# Patient Record
Sex: MALE | Race: WHITE | Employment: OTHER | URBAN - NONMETROPOLITAN AREA
[De-identification: names, ages, dates, MRNs, and addresses within clinical notes are randomized per-mention and may not be internally consistent; named-entity substitution may affect disease eponyms.]

---

## 2021-10-21 ENCOUNTER — APPOINTMENT (OUTPATIENT)
Dept: GENERAL RADIOLOGY | Age: 56
DRG: 918 | End: 2021-10-21
Payer: MEDICAID

## 2021-10-21 ENCOUNTER — HOSPITAL ENCOUNTER (INPATIENT)
Age: 56
LOS: 1 days | Discharge: HOME | DRG: 918 | End: 2021-10-22
Attending: EMERGENCY MEDICINE | Admitting: INTERNAL MEDICINE
Payer: MEDICAID

## 2021-10-21 DIAGNOSIS — T50.904A DRUG OVERDOSE, UNDETERMINED INTENT, INITIAL ENCOUNTER: Primary | ICD-10-CM

## 2021-10-21 PROBLEM — T50.902A INTENTIONAL DRUG OVERDOSE (HCC): Status: ACTIVE | Noted: 2021-10-21

## 2021-10-21 LAB
ACETAMINOPHEN LEVEL: <15 UG/ML
ACETAMINOPHEN LEVEL: <15 UG/ML
ALBUMIN SERPL-MCNC: 4.4 G/DL (ref 3.5–5.2)
ALP BLD-CCNC: 94 U/L (ref 40–130)
ALT SERPL-CCNC: 18 U/L (ref 5–41)
ANION GAP SERPL CALCULATED.3IONS-SCNC: 7 MMOL/L (ref 7–19)
AST SERPL-CCNC: 15 U/L (ref 5–40)
BASE EXCESS ARTERIAL: 1.9 MMOL/L (ref -2–2)
BILIRUB SERPL-MCNC: 0.4 MG/DL (ref 0.2–1.2)
BUN BLDV-MCNC: 10 MG/DL (ref 6–20)
CALCIUM SERPL-MCNC: 9.4 MG/DL (ref 8.6–10)
CARBOXYHEMOGLOBIN ARTERIAL: 1.8 % (ref 0–5)
CHLORIDE BLD-SCNC: 106 MMOL/L (ref 98–111)
CO2: 26 MMOL/L (ref 22–29)
CREAT SERPL-MCNC: 0.7 MG/DL (ref 0.5–1.2)
ETHANOL: <10 MG/DL (ref 0–0.08)
GFR AFRICAN AMERICAN: >59
GFR NON-AFRICAN AMERICAN: >60
GLUCOSE BLD-MCNC: 114 MG/DL (ref 74–109)
HCO3 ARTERIAL: 26.6 MMOL/L (ref 22–26)
HCT VFR BLD CALC: 44.3 % (ref 42–52)
HEMOGLOBIN, ART, EXTENDED: 14.5 G/DL (ref 14–18)
HEMOGLOBIN: 14.7 G/DL (ref 14–18)
INR BLD: 0.91 (ref 0.88–1.18)
MCH RBC QN AUTO: 30.2 PG (ref 27–31)
MCHC RBC AUTO-ENTMCNC: 33.2 G/DL (ref 33–37)
MCV RBC AUTO: 91 FL (ref 80–94)
METHEMOGLOBIN ARTERIAL: 1.4 %
O2 CONTENT ARTERIAL: 18.6 ML/DL
O2 SAT, ARTERIAL: 91.3 %
O2 THERAPY: ABNORMAL
PCO2 ARTERIAL: 41 MMHG (ref 35–45)
PDW BLD-RTO: 13.3 % (ref 11.5–14.5)
PH ARTERIAL: 7.42 (ref 7.35–7.45)
PLATELET # BLD: 234 K/UL (ref 130–400)
PMV BLD AUTO: 11.5 FL (ref 9.4–12.4)
PO2 ARTERIAL: 61 MMHG (ref 80–100)
POTASSIUM SERPL-SCNC: 3.6 MMOL/L (ref 3.5–5)
POTASSIUM, WHOLE BLOOD: 3.4
PROTHROMBIN TIME: 12.5 SEC (ref 12–14.6)
RBC # BLD: 4.87 M/UL (ref 4.7–6.1)
SALICYLATE, SERUM: <3 MG/DL (ref 3–10)
SARS-COV-2, NAAT: NOT DETECTED
SODIUM BLD-SCNC: 139 MMOL/L (ref 136–145)
TOTAL CK: 186 U/L (ref 39–308)
TOTAL PROTEIN: 7.4 G/DL (ref 6.6–8.7)
WBC # BLD: 12.5 K/UL (ref 4.8–10.8)

## 2021-10-21 PROCEDURE — 36415 COLL VENOUS BLD VENIPUNCTURE: CPT

## 2021-10-21 PROCEDURE — 84132 ASSAY OF SERUM POTASSIUM: CPT

## 2021-10-21 PROCEDURE — 82077 ASSAY SPEC XCP UR&BREATH IA: CPT

## 2021-10-21 PROCEDURE — 82803 BLOOD GASES ANY COMBINATION: CPT

## 2021-10-21 PROCEDURE — 2580000003 HC RX 258: Performed by: INTERNAL MEDICINE

## 2021-10-21 PROCEDURE — 82550 ASSAY OF CK (CPK): CPT

## 2021-10-21 PROCEDURE — 2580000003 HC RX 258: Performed by: EMERGENCY MEDICINE

## 2021-10-21 PROCEDURE — 36600 WITHDRAWAL OF ARTERIAL BLOOD: CPT

## 2021-10-21 PROCEDURE — 71045 X-RAY EXAM CHEST 1 VIEW: CPT

## 2021-10-21 PROCEDURE — 80143 DRUG ASSAY ACETAMINOPHEN: CPT

## 2021-10-21 PROCEDURE — 99284 EMERGENCY DEPT VISIT MOD MDM: CPT

## 2021-10-21 PROCEDURE — 80053 COMPREHEN METABOLIC PANEL: CPT

## 2021-10-21 PROCEDURE — 93005 ELECTROCARDIOGRAM TRACING: CPT | Performed by: EMERGENCY MEDICINE

## 2021-10-21 PROCEDURE — 80179 DRUG ASSAY SALICYLATE: CPT

## 2021-10-21 PROCEDURE — 85027 COMPLETE CBC AUTOMATED: CPT

## 2021-10-21 PROCEDURE — 2000000000 HC ICU R&B

## 2021-10-21 PROCEDURE — 85610 PROTHROMBIN TIME: CPT

## 2021-10-21 PROCEDURE — 87635 SARS-COV-2 COVID-19 AMP PRB: CPT

## 2021-10-21 RX ORDER — SODIUM CHLORIDE 9 MG/ML
INJECTION, SOLUTION INTRAVENOUS CONTINUOUS
Status: DISCONTINUED | OUTPATIENT
Start: 2021-10-21 | End: 2021-10-22 | Stop reason: HOSPADM

## 2021-10-21 RX ORDER — SODIUM CHLORIDE 0.9 % (FLUSH) 0.9 %
5-40 SYRINGE (ML) INJECTION EVERY 12 HOURS SCHEDULED
Status: DISCONTINUED | OUTPATIENT
Start: 2021-10-21 | End: 2021-10-22 | Stop reason: HOSPADM

## 2021-10-21 RX ORDER — ONDANSETRON 2 MG/ML
4 INJECTION INTRAMUSCULAR; INTRAVENOUS EVERY 6 HOURS PRN
Status: DISCONTINUED | OUTPATIENT
Start: 2021-10-21 | End: 2021-10-22 | Stop reason: HOSPADM

## 2021-10-21 RX ORDER — ONDANSETRON 4 MG/1
4 TABLET, ORALLY DISINTEGRATING ORAL EVERY 8 HOURS PRN
Status: DISCONTINUED | OUTPATIENT
Start: 2021-10-21 | End: 2021-10-22 | Stop reason: HOSPADM

## 2021-10-21 RX ORDER — SODIUM CHLORIDE 0.9 % (FLUSH) 0.9 %
5-40 SYRINGE (ML) INJECTION PRN
Status: DISCONTINUED | OUTPATIENT
Start: 2021-10-21 | End: 2021-10-22 | Stop reason: HOSPADM

## 2021-10-21 RX ORDER — SODIUM CHLORIDE 9 MG/ML
25 INJECTION, SOLUTION INTRAVENOUS PRN
Status: DISCONTINUED | OUTPATIENT
Start: 2021-10-21 | End: 2021-10-22 | Stop reason: HOSPADM

## 2021-10-21 RX ADMIN — SODIUM CHLORIDE: 9 INJECTION, SOLUTION INTRAVENOUS at 17:55

## 2021-10-21 RX ADMIN — SODIUM CHLORIDE, PRESERVATIVE FREE 10 ML: 5 INJECTION INTRAVENOUS at 21:26

## 2021-10-21 ASSESSMENT — PAIN SCALES - GENERAL
PAINLEVEL_OUTOF10: 0

## 2021-10-21 ASSESSMENT — ENCOUNTER SYMPTOMS
BACK PAIN: 0
DIARRHEA: 0
EYE PAIN: 0
ABDOMINAL PAIN: 0
SHORTNESS OF BREATH: 0
VOMITING: 0

## 2021-10-21 NOTE — ED PROVIDER NOTES
[facility] ICU  eMERGENCY dEPARTMENT eNCOUnter      Pt Name: Ada Kelly  MRN: [id number]  [place] 1965  Date of evaluation: 10/21/2021  Provider: Hector Kent MD    CHIEF COMPLAINT       Chief Complaint   Patient presents with    Drug Overdose     apparently swallowed 3-5 g of meth around 1500 today in attempts to hide it from police.  Seizures     seizure in back of police car, none for EMS but noted tremors         HISTORY OF PRESENT ILLNESS   (Location/Symptom, Timing/Onset,Context/Setting, Quality, Duration, Modifying Factors, Severity)  Note limiting factors. Ada Kelly is a 49 y.o. male who presents to the emergency department due to seizure after ingesting meth. Patient ingested large amount of meth around 3:00 today. Arrested for DUI. Patient said he did not want to get in trouble for his meth possession so he swallowed a large quantity of meth that was wrapped in cellophane. Police took him to long term and on the way to long term patient had a seizure. EMS was then called and brought patient here. Patient is a little diaphoretic and shaky now. Denies any complaints but is not really that forthcoming with information. Tells me he has no medical problems. Tells me he ingested the meth to avoid being arrested for drug possession. HPI    NursingNotes were reviewed. REVIEW OF SYSTEMS    (2-9 systems for level 4, 10 or more for level 5)     Review of Systems   Constitutional: Positive for diaphoresis. Negative for fever. Eyes: Negative for pain. Respiratory: Negative for shortness of breath. Cardiovascular: Negative for chest pain and palpitations. Gastrointestinal: Negative for abdominal pain, diarrhea and vomiting. Genitourinary: Negative for difficulty urinating. Musculoskeletal: Negative for back pain and neck pain. Skin: Negative for rash. Neurological: Negative for weakness, numbness and headaches. All other systems reviewed and are negative.       A complete review of systems was performed and is negative except as noted above in the HPI. PAST MEDICAL HISTORY   History reviewed. No pertinent past medical history. SURGICAL HISTORY     History reviewed. No pertinent surgical history. CURRENT MEDICATIONS       There are no discharge medications for this patient. ALLERGIES     Patient has no known allergies. FAMILY HISTORY     No family history on file. SOCIAL HISTORY       Social History     Socioeconomic History    Marital status: Unknown     Spouse name: None    Number of children: None    Years of education: None    Highest education level: None   Occupational History    None   Tobacco Use    Smoking status: Current Every Day Smoker     Packs/day: 0.50     Types: Cigarettes    Smokeless tobacco: Never Used   Substance and Sexual Activity    Alcohol use: None    Drug use: None    Sexual activity: None   Other Topics Concern    None   Social History Narrative    None     Social Determinants of Health     Financial Resource Strain:     Difficulty of Paying Living Expenses:    Food Insecurity:     Worried About Running Out of Food in the Last Year:     Ran Out of Food in the Last Year:    Transportation Needs:     Lack of Transportation (Medical):      Lack of Transportation (Non-Medical):    Physical Activity:     Days of Exercise per Week:     Minutes of Exercise per Session:    Stress:     Feeling of Stress :    Social Connections:     Frequency of Communication with Friends and Family:     Frequency of Social Gatherings with Friends and Family:     Attends [facility] Services:     Active Member of Clubs or Organizations:     Attends Club or Organization Meetings:     Marital Status:    Intimate Partner Violence:     Fear of Current or Ex-Partner:     Emotionally Abused:     Physically Abused:     Sexually Abused:        SCREENINGS             PHYSICAL EXAM    (up to 7 for level 4, 8 or more for level 5)     ED Triage Vitals [10/21/21 1715]   BP Temp Temp Source Pulse Resp SpO2 Height Weight   131/85 98 °F (36.7 °C) Axillary 106 22 92 % -- --       Physical Exam  Vitals reviewed. Constitutional:       General: He is not in acute distress. Appearance: He is well-developed. He is diaphoretic. Comments: Patient is slightly restless and diaphoretic. HENT:      Head: Normocephalic and atraumatic. Mouth/Throat:      Mouth: Mucous membranes are moist.      Pharynx: Oropharynx is clear. Eyes:      General: No scleral icterus. Pupils: Pupils are equal, round, and reactive to light. Neck:      Vascular: No JVD. Cardiovascular:      Rate and Rhythm: Regular rhythm. Tachycardia present. Heart sounds: Normal heart sounds. Pulmonary:      Effort: Pulmonary effort is normal. No respiratory distress. Breath sounds: Normal breath sounds. Abdominal:      General: There is no distension. Palpations: Abdomen is soft. Tenderness: There is no abdominal tenderness. There is no guarding or rebound. Musculoskeletal:         General: No tenderness. Cervical back: Normal range of motion and neck supple. Right lower leg: Edema present. Left lower leg: Edema present. Comments: Mild edema in bilateral lower extremities. Patient tells me this is baseline   Skin:     General: Skin is warm. Capillary Refill: Capillary refill takes less than 2 seconds. Neurological:      General: No focal deficit present. Mental Status: He is alert and oriented to person, place, and time. Psychiatric:         Mood and Affect: Affect is flat. Speech: Speech normal.         Behavior: Behavior normal.         DIAGNOSTIC RESULTS     EKG: All EKG's are interpreted by the Emergency Department Physician who either signs or Co-signs this chart in the absence of a cardiologist.    Sinus tachycardia. Borderline prolonged QT. I do not see signs of acute ischemia.     RADIOLOGY: Non-plain film images such as CT, Ultrasound and MRI are read by the radiologist. Fletcher Bernsteiner images are visualized and preliminarily interpreted by the emergency physician with the below findings:      Interpretation per the Radiologist below, if available at the time of this note:    XR CHEST PORTABLE   Final Result   1. No acute cardiopulmonary finding. Signed by Dr Madelene Cogan:  Labs Reviewed   BLOOD GAS, ARTERIAL - Abnormal; Notable for the following components:       Result Value    pO2, Arterial 61.0 (*)     HCO3, Arterial 26.6 (*)     All other components within normal limits   CBC - Abnormal; Notable for the following components:    WBC 12.5 (*)     All other components within normal limits   COMPREHENSIVE METABOLIC PANEL - Abnormal; Notable for the following components:    Glucose 114 (*)     All other components within normal limits   COVID-19, RAPID   ACETAMINOPHEN LEVEL   ETHANOL   SALICYLATE LEVEL   PROTIME-INR   POTASSIUM, WHOLE BLOOD   CK   URINE DRUG SCREEN   URINE RT REFLEX TO CULTURE   ACETAMINOPHEN LEVEL   COMPREHENSIVE METABOLIC PANEL W/ REFLEX TO MG FOR LOW K   CBC WITH AUTO DIFFERENTIAL       All other labs were within normal range or not returned as of this dictation. EMERGENCY DEPARTMENT COURSE and DIFFERENTIALDIAGNOSIS/MDM:   Vitals:    Vitals:    10/21/21 1832 10/21/21 1901 10/21/21 1931 10/21/21 2115   BP: 100/76 (!) 129/91 121/88 129/80   Pulse: 106 106 104 101   Resp: 25 27 21 22   Temp:    98.6 °F (37 °C)   TempSrc:    Temporal   SpO2: 91% 91% 91% 91%   Weight:    206 lb 4.8 oz (93.6 kg)   Height:    6' 3\" (1.905 m)       MDM  Patient's case discussed with poison control who recommended supportive care and monitoring for minimum of 12 hours. They also recommended adding a CK which I will do. Case discussed with Dr. Kaur Johnson, hospitalist, who is agreeable plan of care and admission to the ICU. Patient still a bit shaky and diaphoretic.   Slightly tachycardic. Patient denies any complaints at this time. CONSULTS:  None    PROCEDURES:  Unless otherwise notedbelow, none     Procedures    FINAL IMPRESSION     1. Drug overdose, undetermined intent, initial encounter          DISPOSITION/PLAN   DISPOSITION Admitted 10/21/2021 06:15:25 PM      PATIENT REFERRED TO:  @FUP@    DISCHARGE MEDICATIONS:  There are no discharge medications for this patient.          (Please note that portions of this note were completed with a voice recognition program.  Efforts were made to edit the dictations butoccasionally words are mis-transcribed.)    Javon Velez MD (electronically signed)  AttendingEmergency Physician          Javon Velez MD  10/21/21 3266

## 2021-10-21 NOTE — ED NOTES
Bed: 02-A  Expected date:   Expected time:   Means of arrival:   Comments:  EMS [address], RN  10/21/21 5552

## 2021-10-21 NOTE — PROGRESS NOTES
10/21/2021  5:54 PM - [facility] Incoming Lab Results From [facility] Value Ref Range & Units Status Collected Lab   pH, Arterial 7.420  7.350 - 7.450 Final 10/21/2021  5:53 PM [facility] Lab   pCO2, Arterial 41.0  35.0 - 45.0 mmHg Final 10/21/2021  5:53 PM [facility] Lab   pO2, Arterial 61. 0Low   80.0 - 100.0 mmHg Final 10/21/2021  5:53 PM [facility] Lab   HCO3, Arterial 26. 6High   22.0 - 26.0 mmol/L Final 10/21/2021  5:53 PM [facility] Excess, Arterial 1.9  -2.0 - 2.0 mmol/L Final 10/21/2021  5:53 PM [address] [place] [address] Lab   Hemoglobin, Art, Extended 14.5  14.0 - 18.0 g/dL Final 10/21/2021  5:53 PM [address] [place] [address] Lab   O2 Sat, Arterial 91.3  >92 % Final 10/21/2021  5:53 PM [facility] Lab   Carboxyhgb, Arterial 1.8  0.0 - 5.0 % Final 10/21/2021  5:53 PM [facility] Lab        0.0-1.5   (Smokers 1.5-5.0)    Methemoglobin, Arterial 1.4  <1.5 % Final 10/21/2021  5:53 PM [address] [place] [address] Lab   O2 Content, Arterial 18.6  Not Established mL/dL Final 10/21/2021  5:53 PM [address] [place] [address] Lab     Pt on room air  resp rate 28  Right radial puncture AT+

## 2021-10-21 NOTE — H&P
[facility] Medicine H&P    Patient:  Shane Gallegos  MRN: [id number]    Consulting Physician: Mark Espino MD  Reason for Consult: Medical Management  Primacy Care Physician: No primary care provider on file. HISTORY OF PRESENT ILLNESS:   The patient is a 49 y.o. male was brought in by police after being arrested for DUI. The ER attending tells me that he swallowed a bag of methamphetamine. The patient is reluctant to give any history. He will be admitted to the ICU for observation. Poison control has been contacted and they recommend observation at this point and allow time to pass the plastic. Past Medical History:  No past medical history on file. Past Surgical History:  No past surgical history on file. Medications: Scheduled Meds:  Continuous Infusions:   sodium chloride 100 mL/hr at 10/21/21 1755     PRN Meds:. Allergies:  Patient has no known allergies. Social History:   TOBACCO:   has no history on file for tobacco use. ETOH:   has no history on file for alcohol use. Family History:   No family history on file. ROS:  Review of Systems   Unable to perform ROS: Acuity of condition       Physical Exam:    Vitals: /85   Pulse 106   Temp 98 °F (36.7 °C) (Axillary)   Resp 26   SpO2 (!) 87%     Physical Exam  Vitals and nursing note reviewed. Constitutional:       General: He is in acute distress. Appearance: He is ill-appearing, toxic-appearing and diaphoretic. HENT:      Head: Normocephalic and atraumatic. Right Ear: External ear normal.      Left Ear: External ear normal.      Nose: Nose normal.      Mouth/Throat:      Mouth: Mucous membranes are moist.   Eyes:      Conjunctiva/sclera: Conjunctivae normal.      Pupils: Pupils are equal, round, and reactive to light. Cardiovascular:      Rate and Rhythm: Regular rhythm. Tachycardia present. Heart sounds: Normal heart sounds.    Pulmonary:      Effort: Pulmonary effort is normal.      Breath sounds: Normal breath sounds. Abdominal:      General: Abdomen is flat. Palpations: Abdomen is soft. Musculoskeletal:         General: No swelling. Cervical back: Neck supple. No rigidity. Skin:     General: Skin is warm. CBC:   Recent Labs     10/21/21  1715   WBC 12.5*   HGB 14.7        BMP:    Recent Labs     10/21/21  1715 10/21/21  1753     --    K 3.6 3.4     --    CO2 26  --    BUN 10  --    CREATININE 0.7  --    GLUCOSE 114*  --      Hepatic:   Recent Labs     10/21/21  1715   AST 15   ALT 18   BILITOT 0.4   ALKPHOS 94     Troponin: No results for input(s): TROPONINI in the last 72 hours. BNP: No results for input(s): BNP in the last 72 hours. Lipids: No results for input(s): CHOL, HDL in the last 72 hours. Invalid input(s): LDLCALCU  ABGs:   Lab Results   Component Value Date    PHART 7.420 10/21/2021    PO2ART 61.0 10/21/2021    GGP0RBQ 41.0 10/21/2021     INR:   Recent Labs     10/21/21  1715   INR 0.91     -----------------------------------------------------------------  No results found. Assessment and Plan     Intentional drug overdose - methamphetamine. Admit to ICU. Observation. Supportive care. Please document 35 minutes of critical care time for patient assessment, chart review, discussion with staff, .       Kymberly Mann DO

## 2021-10-22 VITALS
BODY MASS INDEX: 25.65 KG/M2 | OXYGEN SATURATION: 92 % | HEIGHT: 75 IN | HEART RATE: 93 BPM | RESPIRATION RATE: 18 BRPM | SYSTOLIC BLOOD PRESSURE: 129 MMHG | TEMPERATURE: 98.8 F | WEIGHT: 206.3 LBS | DIASTOLIC BLOOD PRESSURE: 86 MMHG

## 2021-10-22 PROBLEM — T50.902A INTENTIONAL DRUG OVERDOSE (HCC): Status: RESOLVED | Noted: 2021-10-21 | Resolved: 2021-10-22

## 2021-10-22 LAB
ALBUMIN SERPL-MCNC: 3.9 G/DL (ref 3.5–5.2)
ALP BLD-CCNC: 83 U/L (ref 40–130)
ALT SERPL-CCNC: 14 U/L (ref 5–41)
AMPHETAMINE SCREEN, URINE: POSITIVE
ANION GAP SERPL CALCULATED.3IONS-SCNC: 11 MMOL/L (ref 7–19)
AST SERPL-CCNC: 15 U/L (ref 5–40)
BARBITURATE SCREEN URINE: NEGATIVE
BASOPHILS ABSOLUTE: 0 K/UL (ref 0–0.2)
BASOPHILS RELATIVE PERCENT: 0.4 % (ref 0–1)
BENZODIAZEPINE SCREEN, URINE: NEGATIVE
BILIRUB SERPL-MCNC: 0.5 MG/DL (ref 0.2–1.2)
BILIRUBIN URINE: NEGATIVE
BLOOD, URINE: NEGATIVE
BUN BLDV-MCNC: 8 MG/DL (ref 6–20)
CALCIUM SERPL-MCNC: 8.6 MG/DL (ref 8.6–10)
CANNABINOID SCREEN URINE: NEGATIVE
CHLORIDE BLD-SCNC: 108 MMOL/L (ref 98–111)
CLARITY: CLEAR
CO2: 21 MMOL/L (ref 22–29)
COCAINE METABOLITE SCREEN URINE: NEGATIVE
COLOR: YELLOW
CREAT SERPL-MCNC: 0.6 MG/DL (ref 0.5–1.2)
EKG P AXIS: 65 DEGREES
EKG P-R INTERVAL: 158 MS
EKG Q-T INTERVAL: 362 MS
EKG QRS DURATION: 106 MS
EKG QTC CALCULATION (BAZETT): 442 MS
EKG T AXIS: 57 DEGREES
EOSINOPHILS ABSOLUTE: 0 K/UL (ref 0–0.6)
EOSINOPHILS RELATIVE PERCENT: 0.2 % (ref 0–5)
GFR AFRICAN AMERICAN: >59
GFR NON-AFRICAN AMERICAN: >60
GLUCOSE BLD-MCNC: 114 MG/DL (ref 74–109)
GLUCOSE URINE: NEGATIVE MG/DL
HCT VFR BLD CALC: 41.7 % (ref 42–52)
HEMOGLOBIN: 13.8 G/DL (ref 14–18)
IMMATURE GRANULOCYTES #: 0 K/UL
KETONES, URINE: 15 MG/DL
LEUKOCYTE ESTERASE, URINE: NEGATIVE
LYMPHOCYTES ABSOLUTE: 1.2 K/UL (ref 1.1–4.5)
LYMPHOCYTES RELATIVE PERCENT: 22.9 % (ref 20–40)
Lab: ABNORMAL
MCH RBC QN AUTO: 30.9 PG (ref 27–31)
MCHC RBC AUTO-ENTMCNC: 33.1 G/DL (ref 33–37)
MCV RBC AUTO: 93.5 FL (ref 80–94)
MONOCYTES ABSOLUTE: 0.5 K/UL (ref 0–0.9)
MONOCYTES RELATIVE PERCENT: 9.4 % (ref 0–10)
NEUTROPHILS ABSOLUTE: 3.5 K/UL (ref 1.5–7.5)
NEUTROPHILS RELATIVE PERCENT: 66.9 % (ref 50–65)
NITRITE, URINE: NEGATIVE
OPIATE SCREEN URINE: NEGATIVE
PDW BLD-RTO: 13.6 % (ref 11.5–14.5)
PH UA: 5 (ref 5–8)
PLATELET # BLD: 201 K/UL (ref 130–400)
PMV BLD AUTO: 11.7 FL (ref 9.4–12.4)
POTASSIUM REFLEX MAGNESIUM: 4 MMOL/L (ref 3.5–5)
PROTEIN UA: NEGATIVE MG/DL
RBC # BLD: 4.46 M/UL (ref 4.7–6.1)
SODIUM BLD-SCNC: 140 MMOL/L (ref 136–145)
SPECIFIC GRAVITY UA: 1.02 (ref 1–1.03)
TOTAL PROTEIN: 6.3 G/DL (ref 6.6–8.7)
UROBILINOGEN, URINE: 0.2 E.U./DL
WBC # BLD: 5.2 K/UL (ref 4.8–10.8)

## 2021-10-22 PROCEDURE — 2580000003 HC RX 258: Performed by: INTERNAL MEDICINE

## 2021-10-22 PROCEDURE — 80307 DRUG TEST PRSMV CHEM ANLYZR: CPT

## 2021-10-22 PROCEDURE — 36415 COLL VENOUS BLD VENIPUNCTURE: CPT

## 2021-10-22 PROCEDURE — 93010 ELECTROCARDIOGRAM REPORT: CPT | Performed by: INTERNAL MEDICINE

## 2021-10-22 PROCEDURE — 80053 COMPREHEN METABOLIC PANEL: CPT

## 2021-10-22 PROCEDURE — 85025 COMPLETE CBC W/AUTO DIFF WBC: CPT

## 2021-10-22 PROCEDURE — 81003 URINALYSIS AUTO W/O SCOPE: CPT

## 2021-10-22 PROCEDURE — 6360000002 HC RX W HCPCS: Performed by: INTERNAL MEDICINE

## 2021-10-22 RX ADMIN — ENOXAPARIN SODIUM 40 MG: 40 INJECTION SUBCUTANEOUS at 09:04

## 2021-10-22 RX ADMIN — SODIUM CHLORIDE, PRESERVATIVE FREE 10 ML: 5 INJECTION INTRAVENOUS at 09:04

## 2021-10-22 RX ADMIN — SODIUM CHLORIDE: 9 INJECTION, SOLUTION INTRAVENOUS at 06:23

## 2021-10-22 ASSESSMENT — PAIN SCALES - GENERAL
PAINLEVEL_OUTOF10: 0

## 2021-10-22 NOTE — DISCHARGE SUMMARY
Discharge Summary    Dana Quezada  :  1965  MRN:  [id number]    Admit date:  10/21/2021  Discharge date: 10/22/2021     Admitting Physician:  Lisandro Schumacher DO    Advance Directive: Full Code    Consults: none    Primary Care Physician:  No primary care provider on file. Discharge Diagnoses: Active Problems:    * No active hospital problems. *  Resolved Problems:    Intentional drug overdose ([address]      Significant Diagnostic Studies:   XR CHEST PORTABLE    Result Date: 10/21/2021  EXAM: XR CHEST PORTABLE -- 10/21/2021 6:39 PM HISTORY: 49 years, Male, overdose, methamphetamine, seizure according to clinical note COMPARISON: No existing relevant imaging studies available TECHNIQUE:  1 images. Frontal view of the chest. FINDINGS:  Low lung volumes. No pneumothorax, pleural effusion or focal consolidation. Normal cardiac and mediastinal contours. No acute findings of the bones, soft tissues or upper abdomen. 1. No acute cardiopulmonary finding. Signed by Dr Rajinder Mohr      CBC:   Recent Labs     10/21/21  1715 10/22/21  0124   WBC 12.5* 5.2   HGB 14.7 13.8*    201     BMP:    Recent Labs     10/21/21  1715 10/21/21  1753 10/22/21  0124     --  140   K 3.6 3.4 4.0     --  108   CO2 26  --  21*   BUN 10  --  8   CREATININE 0.7  --  0.6   GLUCOSE 114*  --  114*     INR:   Recent Labs     10/21/21  1715   INR 0.91     Lipids: No results for input(s): CHOL, HDL in the last 72 hours. Invalid input(s): LDLCALCU  ABGs:  Recent Labs     10/21/21  1753   PHART 7.420   CCL3FOO 41.0   PO2ART 61.0*   QXV2QLY 26.6*   BEART 1.9   HGBAE 14.5   P3VXQSPX 91.3   CARBOXHGBART 1.8   02THERAPY Unknown     HgBA1c:  No results for input(s): LABA1C in the last 72 hours. Procedures: None  Hospital Course: Mr. Jose Infante was admitted on 10/21 after swallowing a bag of methamphetamine. He was admitted to the ICU. He was observed overnight. No significant side effects were noted.   Hemodynamics remained stable. On the morning of 10/22 he was essentially back to baseline and I felt he was stable for discharge. Physical Exam:  Vital Signs: BP (!) 134/96   Pulse 94   Temp 98.8 °F (37.1 °C) (Temporal)   Resp 21   Ht 6' 3\" (1.905 m)   Wt 206 lb 4.8 oz (93.6 kg)   SpO2 96%   BMI 25.79 kg/m²   General appearance:. Alert and Cooperative   HEENT: Normocephalic. Chest: clear to auscultation bilaterally without wheezes or rhonchi. Cardiac: Normal heart tones with regular rate and rhythm, S1, S2 normal. No murmurs, gallops, or rubs auscultated. Abdomen:soft, non-tender; normal bowel sounds, no masses, no organomegaly. Extremities: No clubbing or cyanosis. No peripheral edema. Peripheral pulses palpable. Neurologic: Grossly intact. Discharge Medications: There are no discharge medications for this patient. Discharge Instructions: Follow up with PCP in 3-5 days. Take medications as directed. Resume activity as tolerated. Diet: ADULT DIET; Clear Liquid     Disposition: Patient is medically stable and will be discharged to home. Time spent on discharge 40 minutes.     Signed:  Shaquille Pablo,

## 2021-12-25 ENCOUNTER — HOSPITAL ENCOUNTER (EMERGENCY)
Age: 56
Discharge: HOME | End: 2021-12-25
Attending: EMERGENCY MEDICINE
Payer: MEDICAID

## 2021-12-25 ENCOUNTER — APPOINTMENT (OUTPATIENT)
Dept: GENERAL RADIOLOGY | Age: 56
End: 2021-12-25
Payer: MEDICAID

## 2021-12-25 VITALS
HEIGHT: 75 IN | TEMPERATURE: 97.2 F | WEIGHT: 209 LBS | BODY MASS INDEX: 25.99 KG/M2 | HEART RATE: 110 BPM | SYSTOLIC BLOOD PRESSURE: 114 MMHG | RESPIRATION RATE: 20 BRPM | DIASTOLIC BLOOD PRESSURE: 84 MMHG | OXYGEN SATURATION: 97 %

## 2021-12-25 DIAGNOSIS — S61.412A LACERATION OF LEFT HAND WITHOUT FOREIGN BODY, INITIAL ENCOUNTER: Primary | ICD-10-CM

## 2021-12-25 PROCEDURE — 99283 EMERGENCY DEPT VISIT LOW MDM: CPT

## 2021-12-25 PROCEDURE — 90715 TDAP VACCINE 7 YRS/> IM: CPT | Performed by: NURSE PRACTITIONER

## 2021-12-25 PROCEDURE — 6370000000 HC RX 637 (ALT 250 FOR IP): Performed by: NURSE PRACTITIONER

## 2021-12-25 PROCEDURE — 6360000002 HC RX W HCPCS: Performed by: NURSE PRACTITIONER

## 2021-12-25 PROCEDURE — 2500000003 HC RX 250 WO HCPCS: Performed by: NURSE PRACTITIONER

## 2021-12-25 PROCEDURE — 73130 X-RAY EXAM OF HAND: CPT

## 2021-12-25 PROCEDURE — 90471 IMMUNIZATION ADMIN: CPT | Performed by: NURSE PRACTITIONER

## 2021-12-25 PROCEDURE — 12001 RPR S/N/AX/GEN/TRNK 2.5CM/<: CPT

## 2021-12-25 RX ORDER — CEPHALEXIN 500 MG/1
500 CAPSULE ORAL 3 TIMES DAILY
Qty: 21 CAPSULE | Refills: 0 | Status: SHIPPED | OUTPATIENT
Start: 2021-12-25 | End: 2022-01-01

## 2021-12-25 RX ORDER — CEPHALEXIN 250 MG/1
500 CAPSULE ORAL ONCE
Status: COMPLETED | OUTPATIENT
Start: 2021-12-25 | End: 2021-12-25

## 2021-12-25 RX ORDER — LIDOCAINE HYDROCHLORIDE 10 MG/ML
10 INJECTION, SOLUTION EPIDURAL; INFILTRATION; INTRACAUDAL; PERINEURAL ONCE
Status: COMPLETED | OUTPATIENT
Start: 2021-12-25 | End: 2021-12-25

## 2021-12-25 RX ADMIN — LIDOCAINE HYDROCHLORIDE 10 ML: 10 INJECTION, SOLUTION EPIDURAL; INFILTRATION; INTRACAUDAL; PERINEURAL at 22:29

## 2021-12-25 RX ADMIN — TETANUS TOXOID, REDUCED DIPHTHERIA TOXOID AND ACELLULAR PERTUSSIS VACCINE, ADSORBED 0.5 ML: 5; 2.5; 8; 8; 2.5 SUSPENSION INTRAMUSCULAR at 21:39

## 2021-12-25 RX ADMIN — CEPHALEXIN 500 MG: 250 CAPSULE ORAL at 22:34

## 2021-12-25 ASSESSMENT — PAIN SCALES - GENERAL: PAINLEVEL_OUTOF10: 8

## 2021-12-26 NOTE — ED NOTES
Pt's wrist cleaned with normal saline. mepilex dressing applied and left wrist splint.       Nitin Vides RN  12/25/21 3567

## 2021-12-26 NOTE — ED PROVIDER NOTES
[facility] EMERGENCY DEPT  eMERGENCY dEPARTMENT eNCOUnter      Pt Name: Hazel Gomez  MRN: [id number]  [place] 1965  Date of evaluation: 12/25/2021  Provider: Pearl Pedraza, [address]       Chief Complaint   Patient presents with    Hand Laceration         HISTORY OF PRESENT ILLNESS   (Location/Symptom, Timing/Onset,Context/Setting, Quality, Duration, Modifying Factors, Severity)  Note limiting factors. Hazel Gomez is a 49 y.o. male who presents to the emergency department with a laceration  to his left wrist hand area after putting it through a glass window by accident    The history is provided by the patient. Laceration  Location:  Hand  Hand laceration location:  L wrist and L hand  Length:  6  Depth: Through underlying tissue  Quality: straight    Bleeding: controlled    Time since incident:  1 hour  Laceration mechanism:  Broken glass  Foreign body present:  No foreign bodies  Tetanus status:  Unknown      NursingNotes were reviewed. REVIEW OF SYSTEMS    (2-9 systems for level 4, 10 or more for level 5)     Review of Systems   Skin: Positive for wound. Except as noted above the remainder of the review of systems was reviewed and negative. PAST MEDICAL HISTORY   History reviewed. No pertinent past medical history. SURGICALHISTORY     History reviewed. No pertinent surgical history. CURRENT MEDICATIONS       Discharge Medication List as of 12/25/2021 10:44 PM          ALLERGIES     Patient has no known allergies. FAMILY HISTORY     History reviewed. No pertinent family history.        SOCIAL HISTORY       Social History     Socioeconomic History    Marital status: Unknown     Spouse name: None    Number of children: None    Years of education: None    Highest education level: None   Occupational History    None   Tobacco Use    Smoking status: Current Every Day Smoker     Packs/day: 0.50     Types: Cigarettes    Smokeless tobacco: Never Used Substance and Sexual Activity    Alcohol use: None    Drug use: None    Sexual activity: None   Other Topics Concern    None   Social History Narrative    None     Social Determinants of Health     Financial Resource Strain:     Difficulty of Paying Living Expenses: Not on file   Food Insecurity:     Worried About Running Out of Food in the Last Year: Not on file    Tonny of Food in the Last Year: Not on file   Transportation Needs:     Lack of Transportation (Medical): Not on file    Lack of Transportation (Non-Medical): Not on file   Physical Activity:     Days of Exercise per Week: Not on file    Minutes of Exercise per Session: Not on file   Stress:     Feeling of Stress : Not on file   Social Connections:     Frequency of Communication with Friends and Family: Not on file    Frequency of Social Gatherings with Friends and Family: Not on file    Attends Restorationist Services: Not on file    Active Member of [address] [place] [address] [employer] or Organizations: Not on file    Attends Club or Organization Meetings: Not on file    Marital Status: Not on file   Intimate Partner Violence:     Fear of Current or Ex-Partner: Not on file    Emotionally Abused: Not on file    Physically Abused: Not on file    Sexually Abused: Not on file   Housing Stability:     Unable to Pay for Housing in the Last Year: Not on file    Number of [place] in the Last Year: Not on file    Unstable Housing in the Last Year: Not on file       SCREENINGS      @FLOW([id number])@      PHYSICAL EXAM    (up to 7 for level 4, 8 or more for level 5)     ED Triage Vitals [12/25/21 2014]   BP Temp Temp src Pulse Resp SpO2 Height Weight   114/84 97.2 °F (36.2 °C) -- 110 20 97 % 6' 3\" (1.905 m) 209 lb (94.8 kg)       Physical Exam  Vitals and nursing note reviewed. Constitutional:       Appearance: He is well-developed. HENT:      Head: Normocephalic and atraumatic. Eyes:      General: No scleral icterus. Right eye: No discharge. Left eye: No discharge. Cardiovascular:      Rate and Rhythm: Normal rate. Pulmonary:      Effort: No respiratory distress. Musculoskeletal:        Hands:       Cervical back: Normal range of motion and neck supple. Comments: 6cm lac. Normal rom  And strength to hand and wrist.  Bleeding controlled   Neurological:      Mental Status: He is alert. Psychiatric:         Behavior: Behavior normal.         DIAGNOSTIC RESULTS     EKG: All EKG's are interpreted by the Emergency Department Physician who either signs or Co-signsthis chart in the absence of a cardiologist.        RADIOLOGY:   Eun Hummer such as CT, Ultrasound and MRI are read by the radiologist. Plain radiographic images are visualized and preliminarily interpreted by the emergency physician with the below findings:      Interpretation per the Radiologist below, if available at the time of this note:    XR HAND LEFT (MIN 3 VIEWS)   Final Result   1.. Arthritic changes of the hand and wrist. No evidence of acute bony   injury. Signed by Dr Merlin Graham ED BEDSIDEULTRASOUND:   Performed by ED Physician -none    LABS:  Labs Reviewed - No data to display    All other labs were within normal range or not returned as of this dictation. EMERGENCY DEPARTMENT COURSE and DIFFERENTIALDIAGNOSIS/MDM:   Vitals:    Vitals:    12/25/21 2014   BP: 114/84   Pulse: 110   Resp: 20   Temp: 97.2 °F (36.2 °C)   SpO2: 97%   Weight: 209 lb (94.8 kg)   Height: 6' 3\" (1.905 m)           MDM  Dr Katia Kwan inspected wound and pictures sent to Dr Papi Saucedo. Hand and wrist are dirty. Cleansed with hibiclens, iodine and saline and wound flushed extensively before suturing. Antibiotics given and pt to follow with Dr Papi Saucedo Monday at 9 am and to be npo after midnight in case surgery is needed. Wound dressed after suturing and wrist splint applied.   Pt expresses understanding of the plan      CONSULTS:  None    PROCEDURES:  Unless otherwise noted below, none     Lac Repair    Date/Time: 12/25/2021 10:21 PM  Performed by: ANNA Mantilla  Authorized by: Lucian Ha MD     Consent:     Consent obtained:  Verbal    Consent given by:  Patient    Risks discussed:  Infection and pain  Anesthesia (see MAR for exact dosages): Anesthesia method:  Local infiltration    Local anesthetic:  Lidocaine 1% w/o epi  Laceration details:     Location:  Hand    Hand location:  L wrist    Length (cm):  6    Depth (mm):  5  Repair type:     Repair type:  Simple  Pre-procedure details:     Preparation:  Imaging obtained to evaluate for foreign bodies and patient was prepped and draped in usual sterile fashion  Exploration:     Wound exploration: wound explored through full range of motion and entire depth of wound probed and visualized      Wound extent: no foreign bodies/material noted, no nerve damage noted, no tendon damage noted and no underlying fracture noted      Contaminated: yes    Treatment:     Area cleansed with:  Betadine and saline    Amount of cleaning:  Extensive    Irrigation volume:  300    Irrigation method:  Pressure wash and syringe    Visualized foreign bodies/material removed: no    Skin repair:     Repair method:  Sutures    Suture size:  4-0    Suture material:  Nylon    Suture technique:  Simple interrupted    Number of sutures:  9  Approximation:     Approximation:  Close  Post-procedure details:     Dressing:  Antibiotic ointment and sterile dressing    Patient tolerance of procedure: Tolerated well, no immediate complications        FINAL IMPRESSION      1.  Laceration of left hand without foreign body, initial encounter        DISPOSITION/PLAN   DISPOSITION        PATIENT REFERRED TO:  Chadwick Zhang MD  [address] [place] [address]  [phone]      12/27 monday 9am  nothing to eat or drink after midnight sunday night      DISCHARGE MEDICATIONS:  Discharge Medication List as of 12/25/2021 10:44 PM      START taking these medications Details   cephALEXin (KEFLEX) 500 MG capsule Take 1 capsule by mouth 3 times daily for 7 days, Disp-21 capsule, R-0Normal                (Please note that portions of this note were completed with a voice recognitionprogram.  Efforts were made to edit the dictations but occasionally words are mis-transcribed.)    ANNA Hendrix (electronically signed)         ANNA Hendrix  12/26/21 1149

## 2024-10-11 ENCOUNTER — TRANSCRIBE ORDERS (OUTPATIENT)
Dept: ADMINISTRATIVE | Age: 59
End: 2024-10-11

## 2024-10-11 DIAGNOSIS — K40.91 UNILATERAL RECURRENT INGUINAL HERNIA WITHOUT OBSTRUCTION OR GANGRENE: Primary | ICD-10-CM

## 2024-11-11 ENCOUNTER — HOSPITAL ENCOUNTER (OUTPATIENT)
Dept: ULTRASOUND IMAGING | Age: 59
Discharge: HOME | End: 2024-11-11

## 2024-11-11 DIAGNOSIS — K40.91 UNILATERAL RECURRENT INGUINAL HERNIA WITHOUT OBSTRUCTION OR GANGRENE: ICD-10-CM

## 2024-11-11 PROCEDURE — 76870 US EXAM SCROTUM: CPT

## 2024-11-19 ENCOUNTER — OFFICE VISIT (OUTPATIENT)
Dept: SURGERY | Age: 59
End: 2024-11-19

## 2024-11-19 ENCOUNTER — PREP FOR PROCEDURE (OUTPATIENT)
Dept: SURGERY | Age: 59
End: 2024-11-19

## 2024-11-19 VITALS
WEIGHT: 205 LBS | TEMPERATURE: 97.7 F | HEART RATE: 100 BPM | BODY MASS INDEX: 25.49 KG/M2 | HEIGHT: 75 IN | OXYGEN SATURATION: 98 %

## 2024-11-19 DIAGNOSIS — K40.90 RIGHT INGUINAL HERNIA: ICD-10-CM

## 2024-11-19 DIAGNOSIS — K40.90 NON-RECURRENT INGUINAL HERNIA OF RIGHT SIDE WITHOUT OBSTRUCTION OR GANGRENE: Primary | ICD-10-CM

## 2024-11-19 PROCEDURE — 99203 OFFICE O/P NEW LOW 30 MIN: CPT | Performed by: SURGERY

## 2024-11-19 RX ORDER — SODIUM CHLORIDE 0.9 % (FLUSH) 0.9 %
5-40 SYRINGE (ML) INJECTION EVERY 12 HOURS SCHEDULED
OUTPATIENT
Start: 2024-11-19

## 2024-11-19 RX ORDER — SODIUM CHLORIDE, SODIUM LACTATE, POTASSIUM CHLORIDE, CALCIUM CHLORIDE 600; 310; 30; 20 MG/100ML; MG/100ML; MG/100ML; MG/100ML
INJECTION, SOLUTION INTRAVENOUS CONTINUOUS
OUTPATIENT
Start: 2024-11-19

## 2024-11-19 RX ORDER — ACETAMINOPHEN 325 MG/1
1000 TABLET ORAL ONCE
OUTPATIENT
Start: 2024-11-19 | End: 2024-11-19

## 2024-11-19 RX ORDER — CELECOXIB 200 MG/1
200 CAPSULE ORAL ONCE
OUTPATIENT
Start: 2024-11-19 | End: 2024-11-19

## 2024-11-19 RX ORDER — SODIUM CHLORIDE 9 MG/ML
INJECTION, SOLUTION INTRAVENOUS PRN
OUTPATIENT
Start: 2024-11-19

## 2024-11-19 RX ORDER — SODIUM CHLORIDE 0.9 % (FLUSH) 0.9 %
5-40 SYRINGE (ML) INJECTION PRN
OUTPATIENT
Start: 2024-11-19

## 2024-11-19 ASSESSMENT — ENCOUNTER SYMPTOMS: COLOR CHANGE: 1

## 2024-11-19 NOTE — H&P (VIEW-ONLY)
hydrocele.  3.  Moderate left hydrocele.  4.  Otherwise unremarkable scrotal ultrasound.  ______________________________________   Electronically signed by: TONNY MARAVILLA M.D.    ASSESSMENT:   Diagnosis Orders   1. Non-recurrent inguinal hernia of right side without obstruction or gangrene            PLAN:  Orders Placed This Encounter   Procedures    Initiate PAT Protocol     No orders of the defined types were placed in this encounter.  Reviewed PCP note.      Reviewed hernia pathophysiology with the patient who notes understanding.     The risks, benefits, and alternatives were discussed with the pt. He is willing to accept the risks and proceed with a robotic assisted right inguinal hernia repair with mesh, possible bilateral. The surgical risks included but not limited to bleeding, infection, perforation, recurrence, risk of needing further surgery, etc. The anesthetic risks included heart attacks, strokes, pneumonia, phlebitis, etc.  He is willing to accept all risks and proceed with surgery. No guarantees have been given.     Discussed how smoking increases risk for infection and hernia recurrence. Encouraged patient not to smoke.     Return for Post-operative Visit.    Maeve Vincent DO 11/19/2024 3:21 PM

## 2024-11-19 NOTE — PROGRESS NOTES
SUBJECTIVE:  Mr. Mcbride is a 59 y.o. male who presents today with a bulge to the right groin that has been present for 8 months. He was lifting things up in the yard and felt a pop and then noticed a bulge in the area. It does not hurt, but he is aware of it when he is up and about because there is pressure there. It eventually goes away when he lays down.    He notes he does not smoke often.       No past medical history on file.  Past Surgical History:   Procedure Laterality Date    UPPER GASTROINTESTINAL ENDOSCOPY      drank 8 lye as child     No current outpatient medications on file.     No current facility-administered medications for this visit.     Allergies: Patient has no known allergies.    No family history on file.    Social History     Tobacco Use    Smoking status: Former     Current packs/day: 0.50     Types: Cigarettes    Smokeless tobacco: Never   Substance Use Topics    Alcohol use: Yes     Comment: seldom       Review of Systems   Cardiovascular:  Positive for leg swelling.   Genitourinary:  Positive for difficulty urinating.   Skin:  Positive for color change.   All other systems reviewed and are negative.      OBJECTIVE:  Pulse 100   Temp 97.7 °F (36.5 °C) (Temporal)   Ht 1.905 m (6' 3\")   Wt 93 kg (205 lb)   SpO2 98%   BMI 25.62 kg/m²   Physical Exam  Exam conducted with a chaperone present.   Constitutional:       General: He is not in acute distress.     Appearance: Normal appearance. He is normal weight. He is not ill-appearing.   Cardiovascular:      Rate and Rhythm: Normal rate.   Pulmonary:      Effort: Pulmonary effort is normal.   Abdominal:      Hernia: A hernia is present. Hernia is present in the right inguinal area (large bowel containing inguinal hernia, partially reduces when supine).   Neurological:      Mental Status: He is alert.         US Scrotum   IMPRESSION:  1.  Right inguinal hernia containing bowel extending to the right side of the scrotum.  2.  Small right

## 2024-12-04 ENCOUNTER — HOSPITAL ENCOUNTER (OUTPATIENT)
Age: 59
Setting detail: OUTPATIENT SURGERY
Discharge: HOME | End: 2024-12-04
Attending: SURGERY | Admitting: SURGERY

## 2024-12-04 ENCOUNTER — ANESTHESIA (OUTPATIENT)
Dept: OPERATING ROOM | Age: 59
End: 2024-12-04

## 2024-12-04 ENCOUNTER — ANESTHESIA EVENT (OUTPATIENT)
Dept: OPERATING ROOM | Age: 59
End: 2024-12-04

## 2024-12-04 VITALS
RESPIRATION RATE: 12 BRPM | BODY MASS INDEX: 24.87 KG/M2 | OXYGEN SATURATION: 94 % | HEART RATE: 78 BPM | HEIGHT: 75 IN | SYSTOLIC BLOOD PRESSURE: 132 MMHG | DIASTOLIC BLOOD PRESSURE: 94 MMHG | TEMPERATURE: 97 F | WEIGHT: 200 LBS

## 2024-12-04 DIAGNOSIS — K40.90 NON-RECURRENT INGUINAL HERNIA OF RIGHT SIDE WITHOUT OBSTRUCTION OR GANGRENE: Primary | ICD-10-CM

## 2024-12-04 PROCEDURE — 6360000002 HC RX W HCPCS: Performed by: ANESTHESIOLOGY

## 2024-12-04 PROCEDURE — 6360000002 HC RX W HCPCS

## 2024-12-04 PROCEDURE — 6360000002 HC RX W HCPCS: Performed by: SURGERY

## 2024-12-04 PROCEDURE — 64486 TAP BLOCK UNIL BY INJECTION: CPT

## 2024-12-04 PROCEDURE — 2580000003 HC RX 258: Performed by: SURGERY

## 2024-12-04 PROCEDURE — C1781 MESH (IMPLANTABLE): HCPCS | Performed by: SURGERY

## 2024-12-04 PROCEDURE — 3700000001 HC ADD 15 MINUTES (ANESTHESIA): Performed by: SURGERY

## 2024-12-04 PROCEDURE — 7100000011 HC PHASE II RECOVERY - ADDTL 15 MIN: Performed by: SURGERY

## 2024-12-04 PROCEDURE — 3600000019 HC SURGERY ROBOT ADDTL 15MIN: Performed by: SURGERY

## 2024-12-04 PROCEDURE — 3600000009 HC SURGERY ROBOT BASE: Performed by: SURGERY

## 2024-12-04 PROCEDURE — C9290 INJ, BUPIVACAINE LIPOSOME: HCPCS

## 2024-12-04 PROCEDURE — 2500000003 HC RX 250 WO HCPCS

## 2024-12-04 PROCEDURE — 3700000000 HC ANESTHESIA ATTENDED CARE: Performed by: SURGERY

## 2024-12-04 PROCEDURE — 49650 LAP ING HERNIA REPAIR INIT: CPT | Performed by: SURGERY

## 2024-12-04 PROCEDURE — 7100000010 HC PHASE II RECOVERY - FIRST 15 MIN: Performed by: SURGERY

## 2024-12-04 PROCEDURE — S2900 ROBOTIC SURGICAL SYSTEM: HCPCS | Performed by: SURGERY

## 2024-12-04 PROCEDURE — 2709999900 HC NON-CHARGEABLE SUPPLY: Performed by: SURGERY

## 2024-12-04 PROCEDURE — 7100000000 HC PACU RECOVERY - FIRST 15 MIN: Performed by: SURGERY

## 2024-12-04 PROCEDURE — 7100000001 HC PACU RECOVERY - ADDTL 15 MIN: Performed by: SURGERY

## 2024-12-04 DEVICE — MESH HERN MID ANAT XL 7X5 IN RT 3DMAX: Type: IMPLANTABLE DEVICE | Site: INGUINAL | Status: FUNCTIONAL

## 2024-12-04 RX ORDER — HYDROMORPHONE HYDROCHLORIDE 1 MG/ML
0.25 INJECTION, SOLUTION INTRAMUSCULAR; INTRAVENOUS; SUBCUTANEOUS EVERY 5 MIN PRN
Status: COMPLETED | OUTPATIENT
Start: 2024-12-04 | End: 2024-12-04

## 2024-12-04 RX ORDER — ONDANSETRON 2 MG/ML
INJECTION INTRAMUSCULAR; INTRAVENOUS
Status: DISCONTINUED | OUTPATIENT
Start: 2024-12-04 | End: 2024-12-04 | Stop reason: SDUPTHER

## 2024-12-04 RX ORDER — BUPIVACAINE HYDROCHLORIDE 2.5 MG/ML
INJECTION, SOLUTION INFILTRATION; PERINEURAL PRN
Status: DISCONTINUED | OUTPATIENT
Start: 2024-12-04 | End: 2024-12-04 | Stop reason: ALTCHOICE

## 2024-12-04 RX ORDER — LABETALOL 20 MG/4 ML (5 MG/ML) INTRAVENOUS SYRINGE
Status: DISCONTINUED | OUTPATIENT
Start: 2024-12-04 | End: 2024-12-04 | Stop reason: SDUPTHER

## 2024-12-04 RX ORDER — HYDROMORPHONE HYDROCHLORIDE 1 MG/ML
0.5 INJECTION, SOLUTION INTRAMUSCULAR; INTRAVENOUS; SUBCUTANEOUS EVERY 5 MIN PRN
Status: DISCONTINUED | OUTPATIENT
Start: 2024-12-04 | End: 2024-12-04 | Stop reason: HOSPADM

## 2024-12-04 RX ORDER — BUPIVACAINE HYDROCHLORIDE 2.5 MG/ML
INJECTION, SOLUTION EPIDURAL; INFILTRATION; INTRACAUDAL
Status: COMPLETED | OUTPATIENT
Start: 2024-12-04 | End: 2024-12-04

## 2024-12-04 RX ORDER — SODIUM CHLORIDE 9 MG/ML
INJECTION, SOLUTION INTRAVENOUS PRN
Status: DISCONTINUED | OUTPATIENT
Start: 2024-12-04 | End: 2024-12-04 | Stop reason: HOSPADM

## 2024-12-04 RX ORDER — DIPHENHYDRAMINE HYDROCHLORIDE 50 MG/ML
12.5 INJECTION INTRAMUSCULAR; INTRAVENOUS
Status: DISCONTINUED | OUTPATIENT
Start: 2024-12-04 | End: 2024-12-04 | Stop reason: HOSPADM

## 2024-12-04 RX ORDER — SODIUM CHLORIDE 0.9 % (FLUSH) 0.9 %
5-40 SYRINGE (ML) INJECTION EVERY 12 HOURS SCHEDULED
Status: DISCONTINUED | OUTPATIENT
Start: 2024-12-04 | End: 2024-12-04 | Stop reason: HOSPADM

## 2024-12-04 RX ORDER — ROCURONIUM BROMIDE 10 MG/ML
INJECTION, SOLUTION INTRAVENOUS
Status: DISCONTINUED | OUTPATIENT
Start: 2024-12-04 | End: 2024-12-04 | Stop reason: SDUPTHER

## 2024-12-04 RX ORDER — SODIUM CHLORIDE 0.9 % (FLUSH) 0.9 %
5-40 SYRINGE (ML) INJECTION PRN
Status: DISCONTINUED | OUTPATIENT
Start: 2024-12-04 | End: 2024-12-04 | Stop reason: HOSPADM

## 2024-12-04 RX ORDER — DEXAMETHASONE SODIUM PHOSPHATE 10 MG/ML
8 INJECTION, SOLUTION INTRAMUSCULAR; INTRAVENOUS ONCE
Status: COMPLETED | OUTPATIENT
Start: 2024-12-04 | End: 2024-12-04

## 2024-12-04 RX ORDER — CELECOXIB 200 MG/1
200 CAPSULE ORAL ONCE
Status: DISCONTINUED | OUTPATIENT
Start: 2024-12-04 | End: 2024-12-04 | Stop reason: HOSPADM

## 2024-12-04 RX ORDER — MIDAZOLAM HYDROCHLORIDE 2 MG/2ML
2 INJECTION, SOLUTION INTRAMUSCULAR; INTRAVENOUS ONCE
Status: COMPLETED | OUTPATIENT
Start: 2024-12-04 | End: 2024-12-04

## 2024-12-04 RX ORDER — NALOXONE HYDROCHLORIDE 0.4 MG/ML
INJECTION, SOLUTION INTRAMUSCULAR; INTRAVENOUS; SUBCUTANEOUS PRN
Status: DISCONTINUED | OUTPATIENT
Start: 2024-12-04 | End: 2024-12-04 | Stop reason: HOSPADM

## 2024-12-04 RX ORDER — OXYCODONE AND ACETAMINOPHEN 5; 325 MG/1; MG/1
1 TABLET ORAL EVERY 6 HOURS PRN
Qty: 12 TABLET | Refills: 0 | Status: SHIPPED | OUTPATIENT
Start: 2024-12-04 | End: 2024-12-07

## 2024-12-04 RX ORDER — ACETAMINOPHEN 500 MG
1000 TABLET ORAL ONCE
Status: DISCONTINUED | OUTPATIENT
Start: 2024-12-04 | End: 2024-12-04 | Stop reason: HOSPADM

## 2024-12-04 RX ORDER — MEPERIDINE HYDROCHLORIDE 25 MG/ML
12.5 INJECTION INTRAMUSCULAR; INTRAVENOUS; SUBCUTANEOUS EVERY 5 MIN PRN
Status: DISCONTINUED | OUTPATIENT
Start: 2024-12-04 | End: 2024-12-04 | Stop reason: HOSPADM

## 2024-12-04 RX ORDER — LIDOCAINE HYDROCHLORIDE 10 MG/ML
INJECTION, SOLUTION EPIDURAL; INFILTRATION; INTRACAUDAL; PERINEURAL
Status: DISCONTINUED | OUTPATIENT
Start: 2024-12-04 | End: 2024-12-04 | Stop reason: SDUPTHER

## 2024-12-04 RX ORDER — FENTANYL CITRATE 50 UG/ML
INJECTION, SOLUTION INTRAMUSCULAR; INTRAVENOUS
Status: DISCONTINUED | OUTPATIENT
Start: 2024-12-04 | End: 2024-12-04 | Stop reason: SDUPTHER

## 2024-12-04 RX ORDER — METOCLOPRAMIDE HYDROCHLORIDE 5 MG/ML
10 INJECTION INTRAMUSCULAR; INTRAVENOUS
Status: DISCONTINUED | OUTPATIENT
Start: 2024-12-04 | End: 2024-12-04 | Stop reason: HOSPADM

## 2024-12-04 RX ORDER — SODIUM CHLORIDE, SODIUM LACTATE, POTASSIUM CHLORIDE, CALCIUM CHLORIDE 600; 310; 30; 20 MG/100ML; MG/100ML; MG/100ML; MG/100ML
INJECTION, SOLUTION INTRAVENOUS CONTINUOUS
Status: DISCONTINUED | OUTPATIENT
Start: 2024-12-04 | End: 2024-12-04 | Stop reason: HOSPADM

## 2024-12-04 RX ORDER — PROPOFOL 10 MG/ML
INJECTION, EMULSION INTRAVENOUS
Status: DISCONTINUED | OUTPATIENT
Start: 2024-12-04 | End: 2024-12-04 | Stop reason: SDUPTHER

## 2024-12-04 RX ORDER — MIDAZOLAM HYDROCHLORIDE 1 MG/ML
INJECTION, SOLUTION INTRAMUSCULAR; INTRAVENOUS
Status: DISCONTINUED | OUTPATIENT
Start: 2024-12-04 | End: 2024-12-04 | Stop reason: SDUPTHER

## 2024-12-04 RX ORDER — BUPIVACAINE HYDROCHLORIDE 2.5 MG/ML
30 INJECTION, SOLUTION EPIDURAL; INFILTRATION; INTRACAUDAL ONCE
Status: DISCONTINUED | OUTPATIENT
Start: 2024-12-04 | End: 2024-12-04 | Stop reason: HOSPADM

## 2024-12-04 RX ADMIN — ROCURONIUM BROMIDE 20 MG: 10 INJECTION, SOLUTION INTRAVENOUS at 11:16

## 2024-12-04 RX ADMIN — SODIUM CHLORIDE, SODIUM LACTATE, POTASSIUM CHLORIDE, AND CALCIUM CHLORIDE: 600; 310; 30; 20 INJECTION, SOLUTION INTRAVENOUS at 09:25

## 2024-12-04 RX ADMIN — HYDROMORPHONE HYDROCHLORIDE 0.25 MG: 1 INJECTION, SOLUTION INTRAMUSCULAR; INTRAVENOUS; SUBCUTANEOUS at 13:08

## 2024-12-04 RX ADMIN — ROCURONIUM BROMIDE 20 MG: 10 INJECTION, SOLUTION INTRAVENOUS at 12:00

## 2024-12-04 RX ADMIN — CEFAZOLIN 2000 MG: 2 INJECTION, POWDER, FOR SOLUTION INTRAMUSCULAR; INTRAVENOUS at 10:40

## 2024-12-04 RX ADMIN — ONDANSETRON 4 MG: 2 INJECTION INTRAMUSCULAR; INTRAVENOUS at 12:11

## 2024-12-04 RX ADMIN — BUPIVACAINE 15 ML: 13.3 INJECTION, SUSPENSION, LIPOSOMAL INFILTRATION at 10:20

## 2024-12-04 RX ADMIN — ROCURONIUM BROMIDE 60 MG: 10 INJECTION, SOLUTION INTRAVENOUS at 10:33

## 2024-12-04 RX ADMIN — LIDOCAINE HYDROCHLORIDE 50 MG: 10 INJECTION, SOLUTION EPIDURAL; INFILTRATION; INTRACAUDAL; PERINEURAL at 10:32

## 2024-12-04 RX ADMIN — DEXAMETHASONE SODIUM PHOSPHATE 10 MG: 10 INJECTION, SOLUTION INTRAMUSCULAR; INTRAVENOUS at 10:45

## 2024-12-04 RX ADMIN — LABETALOL 20 MG/4 ML (5 MG/ML) INTRAVENOUS SYRINGE 10 MG: at 11:13

## 2024-12-04 RX ADMIN — HYDROMORPHONE HYDROCHLORIDE 0.5 MG: 1 INJECTION, SOLUTION INTRAMUSCULAR; INTRAVENOUS; SUBCUTANEOUS at 12:45

## 2024-12-04 RX ADMIN — MIDAZOLAM 2 MG: 1 INJECTION INTRAMUSCULAR; INTRAVENOUS at 10:05

## 2024-12-04 RX ADMIN — MIDAZOLAM 2 MG: 1 INJECTION INTRAMUSCULAR; INTRAVENOUS at 10:26

## 2024-12-04 RX ADMIN — FENTANYL CITRATE 50 MCG: 0.05 INJECTION, SOLUTION INTRAMUSCULAR; INTRAVENOUS at 10:29

## 2024-12-04 RX ADMIN — HYDROMORPHONE HYDROCHLORIDE 0.25 MG: 1 INJECTION, SOLUTION INTRAMUSCULAR; INTRAVENOUS; SUBCUTANEOUS at 12:59

## 2024-12-04 RX ADMIN — SUGAMMADEX 200 MG: 100 INJECTION, SOLUTION INTRAVENOUS at 12:16

## 2024-12-04 RX ADMIN — BUPIVACAINE HYDROCHLORIDE 15 ML: 2.5 INJECTION, SOLUTION EPIDURAL; INFILTRATION; INTRACAUDAL; PERINEURAL at 10:20

## 2024-12-04 RX ADMIN — PROPOFOL 200 MG: 10 INJECTION, EMULSION INTRAVENOUS at 10:32

## 2024-12-04 RX ADMIN — FENTANYL CITRATE 50 MCG: 0.05 INJECTION, SOLUTION INTRAMUSCULAR; INTRAVENOUS at 10:40

## 2024-12-04 ASSESSMENT — PAIN - FUNCTIONAL ASSESSMENT
PAIN_FUNCTIONAL_ASSESSMENT: 0-10

## 2024-12-04 ASSESSMENT — PAIN DESCRIPTION - LOCATION
LOCATION: ABDOMEN

## 2024-12-04 ASSESSMENT — LIFESTYLE VARIABLES: SMOKING_STATUS: 0

## 2024-12-04 ASSESSMENT — PAIN SCALES - GENERAL
PAINLEVEL_OUTOF10: 6
PAINLEVEL_OUTOF10: 7
PAINLEVEL_OUTOF10: 6
PAINLEVEL_OUTOF10: 5

## 2024-12-04 ASSESSMENT — PAIN DESCRIPTION - DESCRIPTORS: DESCRIPTORS: PRESSURE

## 2024-12-04 NOTE — INTERVAL H&P NOTE
Update History & Physical    The patient's History and Physical of November 19, 2024 was reviewed with the patient and I examined the patient. There was no change. The surgical site was confirmed by the patient and me.     Plan: The risks, benefits, expected outcome, and alternative to the recommended procedure have been discussed with the patient. Patient understands and wants to proceed with the procedure.     Electronically signed by Maeve Vincent DO on 12/4/2024 at 10:05 AM

## 2024-12-04 NOTE — PROGRESS NOTES
Pt states can not swallow pill due to childhood injury with drinking Lye.  refused to take preop meds tylenol and Celebrex.     Electronically signed by Dania Parmar RN on 12/4/2024 at 9:38 AM

## 2024-12-04 NOTE — ANESTHESIA POSTPROCEDURE EVALUATION
Department of Anesthesiology  Postprocedure Note    Patient: Cory Mcbride  MRN: [id number]  YOB: 1965  Date of evaluation: 12/4/2024    Procedure Summary       Date: 12/04/24 Room / Location: [address]    Anesthesia Start: 1029 Anesthesia Stop:     Procedure: ROBOTIC  LAPAROSCOPIC RIGHT INGUINAL HERNIA POSSIBLE BILATERAL (Right) Diagnosis:       Right inguinal hernia      (Right inguinal hernia [K40.90])    Surgeons: Maeve Vincent DO Responsible Provider: Juli Watson APRN - CRNA    Anesthesia Type: General, Regional ASA Status: 2            Anesthesia Type: General, Regional    Estee Phase I: Estee Score: 5    Estee Phase II:      Anesthesia Post Evaluation    Patient location during evaluation: PACU  Patient participation: waiting for patient participation  Level of consciousness: responsive to verbal stimuli and responsive to physical stimuli  Pain score: 0  Airway patency: patent  Nausea & Vomiting: no nausea and no vomiting  Cardiovascular status: hemodynamically stable  Respiratory status: spontaneous ventilation, oral airway and face mask  Hydration status: stable  Comments: BP (!) 150/98   Pulse 78   Temp 97.4 °F    Resp 21     SpO2 99% on 6L facemask. VSS,  Pain management: adequate    No notable events documented.

## 2024-12-04 NOTE — DISCHARGE INSTR - ACTIVITY
No heavy lifting. No lifting more than 15 pounds for 6 weeks.   No work for 2 weeks.  Wear Scrotal Support at all times when up and about.   May shower tomorrow.   Do not soak in tub.  No swimming pools, oceans, lakes, etc for 2 weeks.   Do not drive while on pain medications.      Avoid constipation.   Take colace nightly (up to 300 mg) and miralax daily (up to three doses).   Drink 64 ounces of water and take a powdered fiber supplement daily (ie-Metamucil).    If you have signs of infection, call you doctor. These include:   -Increased pain, swelling, warmth, or redness  -Red streaks leading from the incision  -Pus draining from the incision  -A fever > 100.4        Smoking increases your risk for wound infection and additional complications.

## 2024-12-04 NOTE — OP NOTE
Operative Note      Patient: Cory Mcbride  YOB: 1965  MRN: [id number]    Date of Procedure: 12/4/2024    Pre-Op Diagnosis Codes:      * Right inguinal hernia [K40.90]    Post-Op Diagnosis: Same       Procedure(s):  ROBOTIC  LAPAROSCOPIC RIGHT INGUINAL HERNIA POSSIBLE BILATERAL    Surgeon(s):  Maeve Vincent DO    Assistant:   First Assistant: Day Gordon RN    Anesthesia: General    Estimated Blood Loss (mL): Minimal    Complications: None    Specimens:   * No specimens in log *    Implants:  Implant Name Type Inv. Item Serial No.  Lot No. LRB No. Used Action   MESH NELSON MID ELIZA XL 7X5 IN RT 3DMAX - [id number]  MESH NELSON MID ELIZA XL 7X5 IN RT 3DMAX  BARD DAVOL-WD [id number] Right 1 Implanted         Drains:   NG/OG/NJ/NE Tube Orogastric (Active)       [REMOVED] Urinary Catheter 12/04/24 2 Way (Removed)       Findings:  Infection Present At Time Of Surgery (PATOS) (choose all levels that have infection present):  No infection present  Other Findings: large right indirect inguinal hernia    Detailed Description of Procedure:   Mr. Mcbride was taken to the main operating room, placed on the operating  table supine. After the induction of adequate general endotracheal anesthesia, the abdomen was prepped and draped to a sterile field. Timeout was performed identifying correct equipment, preoperative antibiotics, and procedure.     A small incision was made superior to the umbilicus, 16cm from the ilioinguinal ligament after administration of local anesthetic into the subcutaneous tissue.  This was carried down to the fascia and a size 8 robotic trocar was inserted. The abdomen was insuflatted and the patient tolerated insuflation well.  The laparoscope was advanced and inspection undertaken identifying no injury from initial trocar insertion.     Two working ports were then placed under direct vision. Each were 8 mm robotic trocars, both placed 8cm lateral to the umbilical port. Upon

## 2024-12-04 NOTE — ANESTHESIA PROCEDURE NOTES
Peripheral Block    Patient location during procedure: holding area  Reason for block: post-op pain management  Start time: 12/4/2024 10:20 AM  Staffing  Performed: anesthesiologist   Anesthesiologist: Chelsie Mercado MD  Performed by: Chelsie Mercado MD  Authorized by: Juli Watson APRN - CRNA    Preanesthetic Checklist  Completed: patient identified, IV checked, site marked, risks and benefits discussed, surgical/procedural consents, equipment checked, pre-op evaluation, timeout performed, anesthesia consent given, oxygen available, monitors applied/VS acknowledged, fire risk safety assessment completed and verbalized and blood product R/B/A discussed and consented  Peripheral Block   Patient position: supine  Prep: ChloraPrep  Provider prep: mask and sterile gloves  Patient monitoring: cardiac monitor, continuous pulse ox, frequent blood pressure checks, IV access and responsive to questions  Block type: TAP  Laterality: right  Injection technique: single-shot  Guidance: ultrasound guided    Needle   Needle type: Tuohy   Needle gauge: 22 G  Needle localization: ultrasound guidance  Needle length: 10 cm  Assessment   Injection assessment: negative aspiration for heme, no paresthesia on injection and local visualized surrounding nerve on ultrasound  Paresthesia pain: none  Slow fractionated injection: yes  Hemodynamics: stable  Outcomes: patient tolerated procedure well and uncomplicated    Medications Administered  BUPivacaine (MARCAINE) PF injection 0.25% - Perineural   15 mL - 12/4/2024 10:20:00 AM  BUPivacaine liposome (EXPAREL) injection 1.3% - Perineural   15 mL - 12/4/2024 10:20:00 AM

## 2024-12-04 NOTE — ANESTHESIA PRE PROCEDURE
Department of Anesthesiology  Preprocedure Note       Name:  Cory Mcbride   Age:  59 y.o.  :  1965                                          MRN:  [id number]         Date:  2024      Surgeon: Surgeon(s):  Maeve Vincent DO    Procedure: Procedure(s):  ROBOTIC  LAPAROSCOPIC RIGHT INGUINAL HERNIA POSSIBLE BILATERAL    Medications prior to admission:   Prior to Admission medications    Not on File       Current medications:    Current Facility-Administered Medications   Medication Dose Route Frequency Provider Last Rate Last Admin   • acetaminophen (TYLENOL) tablet 1,000 mg  1,000 mg Oral Once Maeve Vincent DO       • celecoxib (CELEBREX) capsule 200 mg  200 mg Oral Once Maeve Vincent DO       • dexAMETHasone (PF) (DECADRON) injection 8 mg  8 mg IntraVENous Once Maeve Vincent DO       • lactated ringers infusion   IntraVENous Continuous Maeve Vincent  mL/hr at 24 0925 New Bag at 24 0925   • sodium chloride flush 0.9 % injection 5-40 mL  5-40 mL IntraVENous 2 times per day Maeve Vincent DO       • sodium chloride flush 0.9 % injection 5-40 mL  5-40 mL IntraVENous PRN Maeve Vincent DO       • 0.9 % sodium chloride infusion   IntraVENous PRN Maeve Vincent DO       • ceFAZolin (ANCEF) 2,000 mg in sterile water 20 mL IV syringe  2,000 mg IntraVENous On Call to OR Maeve Vincent DO           Allergies:  No Known Allergies    Problem List:    Patient Active Problem List   Diagnosis Code   • Non-recurrent inguinal hernia of right side without obstruction or gangrene K40.90   • Right inguinal hernia K40.90       Past Medical History:        Diagnosis Date   • Inguinal hernia    • Lye ingestion     as a child; \"affected my throat\"       Past Surgical History:        Procedure Laterality Date   • ANKLE FRACTURE SURGERY      \"screw\"   • UPPER GASTROINTESTINAL ENDOSCOPY      drank 8 lye as child       Social History:    Social History     Tobacco Use   • Smoking status: Former

## 2024-12-05 NOTE — PROGRESS NOTES
CLINICAL PHARMACY NOTE: MEDS TO BEDS    Total # of Prescriptions Filled: 1   The following medications were delivered to the patient:  Discharge Medication List as of 12/4/2024  1:55 PM        START taking these medications    Details   oxyCODONE-acetaminophen (PERCOCET) 5-325 MG per tablet Take 1 tablet by mouth every 6 hours as needed for Pain for up to 3 days. Intended supply: 3 days. Take lowest dose possible to manage pain Max Daily Amount: 4 tablets, Disp-12 tablet, R-0Normal               Additional Documentation:  Handed to patients family at [facility]. Paid with cash

## 2025-06-17 ENCOUNTER — OFFICE VISIT (OUTPATIENT)
Dept: ENT CLINIC | Age: 60
End: 2025-06-17
Payer: COMMERCIAL

## 2025-06-17 VITALS
SYSTOLIC BLOOD PRESSURE: 127 MMHG | HEIGHT: 75 IN | BODY MASS INDEX: 26.11 KG/M2 | DIASTOLIC BLOOD PRESSURE: 80 MMHG | WEIGHT: 210 LBS

## 2025-06-17 DIAGNOSIS — R13.19 ESOPHAGEAL DYSPHAGIA: Primary | ICD-10-CM

## 2025-06-17 PROCEDURE — 99203 OFFICE O/P NEW LOW 30 MIN: CPT | Performed by: PHYSICIAN ASSISTANT

## 2025-06-17 RX ORDER — DULOXETIN HYDROCHLORIDE 20 MG/1
20 CAPSULE, DELAYED RELEASE ORAL DAILY
COMMUNITY
Start: 2025-05-13

## 2025-06-17 RX ORDER — IBUPROFEN 800 MG/1
TABLET, FILM COATED ORAL
COMMUNITY
Start: 2025-05-12

## 2025-06-17 ASSESSMENT — ENCOUNTER SYMPTOMS
TROUBLE SWALLOWING: 0
RHINORRHEA: 0
VOICE CHANGE: 0
SINUS PAIN: 0
PHOTOPHOBIA: 0
SORE THROAT: 0
EYE PAIN: 0
SINUS PRESSURE: 0
FACIAL SWELLING: 0

## 2025-06-17 NOTE — PROGRESS NOTES
[facility] OTOLARYNGOLOGY/ENT  Mr. Mcbride is a pleasant 60-year-old  male that was referred by Eunice Daniels due to problems with dysphagia.  Patient reports that as a child he accidentally swallowed Lye and was noted with lots of scarring of the esophagus.  Since then he requires dilatation due to recurrent dysphagia.  Currently he is getting choked on solid foods as well as getting choked when he takes an aspirin.  Denies any vocal hoarseness or any additional symptoms.  Patient admits to daily usage of nonsteroidal therapy.        Allergies: Patient has no known allergies.      Current Outpatient Medications   Medication Sig Dispense Refill    ibuprofen (ADVIL;MOTRIN) 800 MG tablet TAKE ONE TABLET BY MOUTH TWICE DAILY AS NEEDED FOR PAIN      metFORMIN (GLUCOPHAGE) 500 MG tablet Take 1 tablet by mouth daily      DULoxetine (CYMBALTA) 20 MG extended release capsule Take 1 capsule by mouth daily       No current facility-administered medications for this visit.       Past Surgical History:   Procedure Laterality Date    ANKLE FRACTURE SURGERY      \"screw\"    HERNIA REPAIR Right 12/4/2024    ROBOTIC  LAPAROSCOPIC RIGHT INGUINAL HERNIA POSSIBLE BILATERAL performed by Maeve Vincent DO at [facility] OR    UPPER GASTROINTESTINAL ENDOSCOPY      drank 8 lye as child       Past Medical History:   Diagnosis Date    Inguinal hernia     Lye ingestion     as a child; \"affected my throat\"       Family History   Problem Relation Age of Onset    Diabetes Mother     Breast Cancer Mother        Social History     Tobacco Use    Smoking status: Former     Current packs/day: 0.50     Types: Cigarettes    Smokeless tobacco: Never   Substance Use Topics    Alcohol use: Yes     Comment: occ           REVIEW OF SYSTEMS:  all other systems reviewed and are negative  Review of Systems   Constitutional:  Negative for chills and fever.   HENT:  Negative for congestion, dental problem, ear discharge, ear pain, facial swelling, hearing

## 2025-06-17 NOTE — ASSESSMENT & PLAN NOTE
Progressive esophageal dysphagia with unremarkable flex laryngoscopy  Plan: Based on the patient's history, I have recommended a referral to [facility] for upper GI endoscopy and probable esophageal dilatation.  He was advised to call if he has any questions or concerns.  He is to follow-up with me as needed.

## 2025-06-23 ENCOUNTER — OFFICE VISIT (OUTPATIENT)
Dept: GASTROENTEROLOGY | Age: 60
End: 2025-06-23
Payer: COMMERCIAL

## 2025-06-23 VITALS
HEIGHT: 75 IN | SYSTOLIC BLOOD PRESSURE: 120 MMHG | DIASTOLIC BLOOD PRESSURE: 80 MMHG | WEIGHT: 211 LBS | BODY MASS INDEX: 26.24 KG/M2

## 2025-06-23 DIAGNOSIS — R13.19 ESOPHAGEAL DYSPHAGIA: Primary | ICD-10-CM

## 2025-06-23 PROCEDURE — 99204 OFFICE O/P NEW MOD 45 MIN: CPT

## 2025-06-23 ASSESSMENT — ENCOUNTER SYMPTOMS
RESPIRATORY NEGATIVE: 1
NAUSEA: 0
DIARRHEA: 0
CHOKING: 0
ABDOMINAL PAIN: 0
TROUBLE SWALLOWING: 1
EYES NEGATIVE: 1
VOMITING: 0
GASTROINTESTINAL NEGATIVE: 1
CONSTIPATION: 0
ALLERGIC/IMMUNOLOGIC NEGATIVE: 1

## 2025-06-23 NOTE — PATIENT INSTRUCTIONS
Patient Education        Upper Gastrointestinal (GI) Endoscopy: Before Your Procedure  What is an upper gastrointestinal (GI) endoscopy?  An upper gastrointestinal (GI) endoscopy is a procedure that allows your doctor to look at the inside lining of your esophagus, your stomach, and the first part of your small intestine (duodenum). A thin, flexible viewing tool called an endoscope (scope) is used. The tip of the scope is inserted through your mouth and then gently moved down your throat into the esophagus, stomach, and duodenum.  This procedure is sometimes called esophagogastroduodenoscopy (say “ih-SOF-uh-go-GAS-tro-DOO-aw-duh-YAK-pzre-sxs”), or EGD.  Using the scope, your doctor can look for ulcers, inflammation, tumors, infection, or bleeding. The procedure also can be used to look for signs of acid backing up into your esophagus. This is called gastroesophageal reflux disease, or GERD. The doctor can collect tissue samples (biopsy), remove polyps, and treat bleeding through the scope. Your doctor may find problems that do not show up on X-ray tests.  How do you prepare for the procedure?  Procedures can be stressful. This information will help you understand what you can expect. And it will help you safely prepare for your procedure.  Preparing for the procedure  Do not eat or drink anything for 6 to 8 hours before the procedure. An empty stomach helps your doctor see your stomach clearly during the procedure. It also reduces your chances of vomiting. If you vomit, there is a small risk that the vomit could enter your lungs. (This is called aspiration.) If the procedure is done in an emergency, a tube may be inserted through your nose or mouth to empty your stomach.  Be sure you have someone to take you home. Anesthesia and pain medicine will make it unsafe for you to drive or get home on your own.  Understand exactly what procedure is planned, along with the risks, benefits, and other options.  Tell your

## 2025-06-23 NOTE — PROGRESS NOTES
Subjective:     Patient ID: Cory Mcbride is a 60 y.o. male  PCP: No primary care provider on file.  Referring Provider: Hansel Hearn PA*    HPI  History of Present Illness  The patient presents for evaluation of trouble swallowing.    He has been experiencing dysphagia since childhood, which he attributes to an incident where he ingested lye. The last evaluation of this condition was conducted when he was 20 years old, during which a procedure was performed to stretch the scar tissue. He was informed that a repeat procedure would be necessary as he aged. He reports difficulty in swallowing various food items including bread, meat, and liquids, and even small pills. He does not experience symptoms of acid indigestion or heartburn, with the exception of one episode that occurred after consuming garlic and butter. He also does not report any instances of nausea, vomiting, or abdominal pain. Recently, he had to crush a sinus pill due to his allergies, but it got lodged in his throat during ingestion. He underwent blood work approximately a month ago while in rehab. A recent endoscopic examination revealed no abnormalities.    PAST SURGICAL HISTORY:  He had a hernia operation on 12/04/2024.    Results  Last EGD: 20 years old- esophageal dilation    Last Colonoscopy: never      Assessment:     Assessment & Plan  1. Dysphagia:  - Reports difficulty swallowing bread, meat, liquids, and small pills.  - No associated acid indigestion, nausea, vomiting, or abdominal pain.  - Schedule EGD with dilation  - If inflammation is detected, consider prescribing an acid reducer.    Follow-up: Follow up in 6 to 8 weeks to review pathology results.    1. Esophageal dysphagia       Review of Systems   Constitutional: Negative.  Negative for appetite change and unexpected weight change.   HENT:  Positive for trouble swallowing.    Eyes: Negative.    Respiratory: Negative.  Negative for choking.    Cardiovascular: Negative.

## 2025-06-26 ENCOUNTER — TELEPHONE (OUTPATIENT)
Dept: GASTROENTEROLOGY | Age: 60
End: 2025-06-26

## 2025-06-26 NOTE — TELEPHONE ENCOUNTER
Called patient to confirm procedure scheduled for 7.1.25 @1015 with  at [facility] [place]       No answer/ lvm

## 2025-07-01 ENCOUNTER — TELEPHONE (OUTPATIENT)
Dept: GASTROENTEROLOGY | Age: 60
End: 2025-07-01

## 2025-07-01 ENCOUNTER — APPOINTMENT (OUTPATIENT)
Dept: OPERATING ROOM | Age: 60
End: 2025-07-01
Attending: INTERNAL MEDICINE

## 2025-07-01 ENCOUNTER — ANESTHESIA EVENT (OUTPATIENT)
Dept: OPERATING ROOM | Age: 60
End: 2025-07-01

## 2025-07-01 ENCOUNTER — ANESTHESIA (OUTPATIENT)
Dept: OPERATING ROOM | Age: 60
End: 2025-07-01

## 2025-07-01 ENCOUNTER — HOSPITAL ENCOUNTER (OUTPATIENT)
Age: 60
Setting detail: OUTPATIENT SURGERY
Discharge: HOME | End: 2025-07-01
Attending: INTERNAL MEDICINE | Admitting: INTERNAL MEDICINE
Payer: COMMERCIAL

## 2025-07-01 VITALS
SYSTOLIC BLOOD PRESSURE: 128 MMHG | DIASTOLIC BLOOD PRESSURE: 87 MMHG | HEART RATE: 61 BPM | TEMPERATURE: 97.5 F | HEIGHT: 75 IN | RESPIRATION RATE: 16 BRPM | OXYGEN SATURATION: 97 % | BODY MASS INDEX: 26.73 KG/M2 | WEIGHT: 215 LBS

## 2025-07-01 DIAGNOSIS — R13.19 ESOPHAGEAL DYSPHAGIA: Primary | ICD-10-CM

## 2025-07-01 PROCEDURE — 43249 ESOPH EGD DILATION <30 MM: CPT

## 2025-07-01 PROCEDURE — 43220 ESOPHAGOSCOPY BALLOON <30MM: CPT | Performed by: INTERNAL MEDICINE

## 2025-07-01 RX ORDER — PROPOFOL 10 MG/ML
INJECTION, EMULSION INTRAVENOUS
Status: DISCONTINUED | OUTPATIENT
Start: 2025-07-01 | End: 2025-07-01 | Stop reason: SDUPTHER

## 2025-07-01 RX ORDER — SODIUM CHLORIDE 9 MG/ML
INJECTION, SOLUTION INTRAVENOUS CONTINUOUS
Status: DISCONTINUED | OUTPATIENT
Start: 2025-07-01 | End: 2025-07-01 | Stop reason: HOSPADM

## 2025-07-01 RX ORDER — LIDOCAINE HYDROCHLORIDE 10 MG/ML
INJECTION, SOLUTION INFILTRATION; PERINEURAL
Status: DISCONTINUED | OUTPATIENT
Start: 2025-07-01 | End: 2025-07-01 | Stop reason: SDUPTHER

## 2025-07-01 RX ADMIN — PROPOFOL 50 MG: 10 INJECTION, EMULSION INTRAVENOUS at 12:25

## 2025-07-01 RX ADMIN — PROPOFOL 50 MG: 10 INJECTION, EMULSION INTRAVENOUS at 12:21

## 2025-07-01 RX ADMIN — PROPOFOL 100 MG: 10 INJECTION, EMULSION INTRAVENOUS at 12:17

## 2025-07-01 RX ADMIN — PROPOFOL 50 MG: 10 INJECTION, EMULSION INTRAVENOUS at 12:32

## 2025-07-01 RX ADMIN — SODIUM CHLORIDE: 9 INJECTION, SOLUTION INTRAVENOUS at 12:18

## 2025-07-01 RX ADMIN — PROPOFOL 50 MG: 10 INJECTION, EMULSION INTRAVENOUS at 12:23

## 2025-07-01 RX ADMIN — LIDOCAINE HYDROCHLORIDE 50 MG: 10 INJECTION, SOLUTION INFILTRATION; PERINEURAL at 12:15

## 2025-07-01 RX ADMIN — PROPOFOL 100 MG: 10 INJECTION, EMULSION INTRAVENOUS at 12:15

## 2025-07-01 RX ADMIN — SODIUM CHLORIDE: 9 INJECTION, SOLUTION INTRAVENOUS at 11:02

## 2025-07-01 RX ADMIN — PROPOFOL 50 MG: 10 INJECTION, EMULSION INTRAVENOUS at 12:28

## 2025-07-01 RX ADMIN — PROPOFOL 50 MG: 10 INJECTION, EMULSION INTRAVENOUS at 12:30

## 2025-07-01 ASSESSMENT — PAIN - FUNCTIONAL ASSESSMENT
PAIN_FUNCTIONAL_ASSESSMENT: NONE - DENIES PAIN
PAIN_FUNCTIONAL_ASSESSMENT: NONE - DENIES PAIN

## 2025-07-01 ASSESSMENT — LIFESTYLE VARIABLES: SMOKING_STATUS: 0

## 2025-07-01 NOTE — ANESTHESIA PRE PROCEDURE
Department of Anesthesiology  Preprocedure Note       Name:  Cory Mcbride   Age:  60 y.o.  :  1965                                          MRN:  [id number]         Date:  2025      Surgeon: Surgeon(s):  Izabel Bhandari MD    Procedure: Procedure(s):  ESOPHAGOGASTRODUODENOSCOPY BIOPSY    Medications prior to admission:   Prior to Admission medications    Medication Sig Start Date End Date Taking? Authorizing Provider   ibuprofen (ADVIL;MOTRIN) 800 MG tablet TAKE ONE TABLET BY MOUTH TWICE DAILY AS NEEDED FOR PAIN 25  Yes Jake Malone MD   DULoxetine (CYMBALTA) 20 MG extended release capsule Take 1 capsule by mouth daily 25  Yes Jake Malone MD   metFORMIN (GLUCOPHAGE) 500 MG tablet Take 1 tablet by mouth daily  Patient not taking: Reported on 2025   Jake Malone MD       Current medications:    No current facility-administered medications for this encounter.       Allergies:  No Known Allergies    Problem List:    Patient Active Problem List   Diagnosis Code   • Non-recurrent inguinal hernia of right side without obstruction or gangrene K40.90   • Right inguinal hernia K40.90   • Esophageal dysphagia R13.19       Past Medical History:        Diagnosis Date   • Inguinal hernia    • Lye ingestion     as a child; \"affected my throat\"       Past Surgical History:        Procedure Laterality Date   • ANKLE FRACTURE SURGERY      \"screw\"   • HERNIA REPAIR Right 2024    ROBOTIC  LAPAROSCOPIC RIGHT INGUINAL HERNIA POSSIBLE BILATERAL performed by Maeve Vincent DO at [facility] OR   • UPPER GASTROINTESTINAL ENDOSCOPY      drank 8 lye as child       Social History:    Social History     Tobacco Use   • Smoking status: Former     Current packs/day: 0.50     Types: Cigarettes   • Smokeless tobacco: Never   Substance Use Topics   • Alcohol use: Not Currently     Comment: occ                                Counseling given: Not Answered      Vital Signs

## 2025-07-01 NOTE — H&P
Patient Name: Cory Mcbride  : 1965  MRN: [id number]  DATE: 25    Allergies: No Known Allergies     ENDOSCOPY  History and Physical    Procedure:    [] Diagnostic Colonoscopy       [] Screening Colonoscopy  [x] EGD      [] ERCP      [] EUS       [] Other    [x] Previous office notes/History and Physical reviewed from the patients chart. Please see EMR for further details of HPI. I have examined the patient's status immediately prior to the procedure and:      Indications/HPI:      Esophageal dysphagia   History of lye ingestion in childhood with esophageal stricture subsequently requiring dilation when he was 20 years old  History of NSAIDs use    []Abdominal Pain   []Cancer- GI/Lung     []Fhx of colon CA/polyps  []History of Polyps  []Barretts            []Melena  []Abnormal Imaging              []Dysphagia              []Persistent Pneumonia   []Anemia                            []Food Impaction        []History of Polyps  [] GI Bleed             []Pulmonary nodule/Mass   []Change in bowel habits []Heartburn/Reflux  []Rectal Bleed (BRBPR)  []Chest Pain - Non Cardiac []Heme (+) Stool []Ulcers  []Constipation  []Hemoptysis  []Varices  []Diarrhea  []Hypoxemia    []Nausea/Vomiting   []Screening   []Crohns/Colitis  []Other:     Anesthesia:   [x] MAC [] Moderate Sedation   [] General   [] None     ROS: 12 pt Review of Symptoms was negative unless mentioned above    Medications:   Prior to Admission medications    Medication Sig Start Date End Date Taking? Authorizing Provider   ibuprofen (ADVIL;MOTRIN) 800 MG tablet TAKE ONE TABLET BY MOUTH TWICE DAILY AS NEEDED FOR PAIN 25  Yes Jake Malone MD   DULoxetine (CYMBALTA) 20 MG extended release capsule Take 1 capsule by mouth daily 25  Yes Jake Malone MD   metFORMIN (GLUCOPHAGE) 500 MG tablet Take 1 tablet by mouth daily  Patient not taking: Reported on 2025   Jake Malone MD       Past Medical  History:  Past Medical History:   Diagnosis Date    Inguinal hernia     Lye ingestion     as a child; \"affected my throat\"       Past Surgical History:  Past Surgical History:   Procedure Laterality Date    ANKLE FRACTURE SURGERY      \"screw\"    HERNIA REPAIR Right 12/4/2024    ROBOTIC  LAPAROSCOPIC RIGHT INGUINAL HERNIA POSSIBLE BILATERAL performed by Maeve Vincnet DO at [facility] OR    UPPER GASTROINTESTINAL ENDOSCOPY      drank 8 lye as child       Social History:  Social History     Tobacco Use    Smoking status: Former     Current packs/day: 0.50     Types: Cigarettes    Smokeless tobacco: Never   Vaping Use    Vaping status: Some Days   Substance Use Topics    Alcohol use: Not Currently     Comment: occ    Drug use: Never       Vital Signs:   Vitals:    07/01/25 1041   BP: 129/89   Pulse: 63   Resp: 18   Temp: 97.5 °F (36.4 °C)   SpO2: 98%        Physical Exam:  Cardiac:  [x]WNL  []Comments:  Pulmonary:  [x]WNL   []Comments:  Neuro/Mental Status:  [x]WNL  []Comments:  Abdominal:  [x]WNL    []Comments:  Other:   []WNL  []Comments:    Informed Consent:  The risks and benefits of the procedure have been discussed with either the patient or if they cannot consent, their representative.    Assessment:  Patient examined and appropriate for planned sedation and procedure.     Plan:  Proceed with planned sedation and procedure as above.         Izabel Bhandari MD

## 2025-07-01 NOTE — DISCHARGE INSTRUCTIONS
1.  Schedule an outpatient upper GI barium swallow study in the next few days.   2.  Repeat EGD at [facility] on a Thursday 2 weeks from today for evaluation (and hopefully after his upper GI barium esophagogram results are available) when examination with the 5.6 mm in diameter spaghetti scope and/or with fluoroscopy if necessary can be done.  Will consider dilation of the esophageal stricture with Savary or TTS-CRE balloon at that time.  Depending on the esophagogram results, we will also consider referral of this patient to a tertiary center such as [facility] where interventional GI with thoracic surgery backup will be available for appropriate management of his esophageal stricture.  3.  Strict soft/puréed diet until his next EGD can be done  4.  Avoid NSAIDs strictly; may use Tylenol 500 mg p.o. every 4 hours as needed instead  5.  Continue empirical therapy for GERD along with anti-GERD measures  - Resume previous meds and diet  - GI clinic f/u 6-8 weeks with Ms. Harrell    - Keep scheduled f/u appts with other MDs     POST-OP ORDERS: ENDOSCOPY & COLONOSCOPY:  1. Rest today.  2. DO NOT eat or drink until wide awake; eat your usual diet today in moderate amount only.  3. DO NOT drive today.  4. Call physician if you have severe pain, vomiting, fever, rectal bleeding or black bowel movements.  5.  If a biopsy was taken or a polyp removed, you should expect to hear results in about 7-10 days.  If you have heard nothing from your physician by then, call the office for results.  6.  Discharge home when patient awake, vitals signs stable and tolerating liquids.  7. Call with questions or concerns [phone].    NSAIDS (Non-steroidal Anti-Inflammatory)  You have been directed by your physician to avoid any NSAID's; the following medications are a list of those to avoid. If you think that you are taking any NSAID's notify your physician.     Over The Counter         Tylenol is

## 2025-07-01 NOTE — TELEPHONE ENCOUNTER
7-1-2025  Per Dr Bhandari op note recommendations                EGD was incomplete. Andrew can you schedule EGD Thursday         Routed to Andrew Waldron

## 2025-07-01 NOTE — ANESTHESIA POSTPROCEDURE EVALUATION
Department of Anesthesiology  Postprocedure Note    Patient: Cory Mcbride  MRN: [id number]  YOB: 1965  Date of evaluation: 7/1/2025    Procedure Summary       Date: 07/01/25 Room / Location: [place] [address] / [facility]    Anesthesia Start: 1211 Anesthesia Stop: 1234    Procedures:       ESOPHAGOGASTRODUODENOSCOPY BIOPSY (Esophagus)      ESOPHAGOGASTRODUODENOSCOPY DILATION BALLOON 8MM - 12MM Diagnosis:       Esophageal dysphagia      (Esophageal dysphagia [R13.19])    Surgeons: Izabel Bhandari MD Responsible Provider: Giovanna Valentin APRN - CRNA    Anesthesia Type: general, TIVA ASA Status: 2            Anesthesia Type: No value filed.    Estee Phase I: Estee Score: 10    Estee Phase II:      Anesthesia Post Evaluation    Patient location during evaluation: bedside  Patient participation: complete - patient participated  Level of consciousness: sleepy but conscious  Pain score: 0  Airway patency: patent  Nausea & Vomiting: no nausea and no vomiting  Cardiovascular status: hemodynamically stable and blood pressure returned to baseline  Respiratory status: acceptable and nasal cannula  Hydration status: stable  Comments: /89   Pulse 63   Temp 97.5 °F (36.4 °C) (Temporal)   Resp 18   Ht 1.905 m (6' 3\")   Wt 97.5 kg (215 lb)   SpO2 98%   BMI 26.87 kg/m²     Pain management: adequate    No notable events documented.

## 2025-07-01 NOTE — OP NOTE
Endoscopic Procedure Note    Patient: Cory Mcbride : 1965  [facility] Rec#: [id number] Acc#: [id number]     Primary Care Provider No primary care provider on file.    Endoscopist: Izabel Bhandari MD, MD    Date of Procedure:  2025    Procedure:   EGD -an incomplete exam up to the middle third of esophagus only with a failed attempt at balloon dilation over the guidewire of the stricture up to 12 mm, despite which the endoscope tip could not be advanced any further.    Indications:     Esophageal dysphagia   History of lye ingestion in childhood with esophageal stricture subsequently requiring dilation when he was 20 years old  History of NSAIDs use    Anesthesia:  Sedation was administered by anesthesia who monitored the patient during the procedure.    Estimated Blood Loss: minimal    Procedure:   After reviewing the patient's chart and obtaining informed consent, the patient was placed in the left lateral decubitus position.  A forward-viewing Olympus endoscope was lubricated and inserted through the mouth into the oropharynx. Under direct visualization, the upper esophagus was intubated. The scope was advanced to the level of the middle third of the esophagus only to the proximal aspect of a tight ringlike stricture.  (Further advancement of the endoscope tip was not feasible on this exam before or after attempts at a wire-guided balloon dilation of the stricture).    Next, with the scope tip was positioned on the proximal aspect of the stricture in the mid esophagus, initially a 8-9 to 10 mm (and later a 10 to 11 to 12 mm) in diameter TTS CRE (through-the-scope controlled radial expansion) balloon tipped catheter over a guidewire was introduced through the endoscope and distal to the stricture after which the balloon tipped catheter was advanced out of the scope until the balloon was completely outside the scope tip under direct visualization.  Next the endoscope with the balloon was

## 2025-07-21 ENCOUNTER — TELEPHONE (OUTPATIENT)
Dept: GASTROENTEROLOGY | Age: 60
End: 2025-07-21

## 2025-07-21 NOTE — TELEPHONE ENCOUNTER
Called patient to confirm procedure scheduled for   7.24.25 @8298  With  at [facility]        Patient confirmed

## 2025-07-23 ENCOUNTER — ANESTHESIA EVENT (OUTPATIENT)
Dept: ENDOSCOPY | Age: 60
End: 2025-07-23
Payer: COMMERCIAL

## 2025-07-24 ENCOUNTER — ANCILLARY PROCEDURE (OUTPATIENT)
Dept: ENDOSCOPY | Age: 60
End: 2025-07-24
Attending: INTERNAL MEDICINE
Payer: COMMERCIAL

## 2025-07-24 ENCOUNTER — ANESTHESIA (OUTPATIENT)
Dept: ENDOSCOPY | Age: 60
End: 2025-07-24
Payer: COMMERCIAL

## 2025-07-24 ENCOUNTER — HOSPITAL ENCOUNTER (OUTPATIENT)
Age: 60
Setting detail: OUTPATIENT SURGERY
Discharge: HOME | End: 2025-07-24
Attending: INTERNAL MEDICINE | Admitting: INTERNAL MEDICINE
Payer: COMMERCIAL

## 2025-07-24 ENCOUNTER — APPOINTMENT (OUTPATIENT)
Dept: GENERAL RADIOLOGY | Age: 60
End: 2025-07-24
Attending: INTERNAL MEDICINE
Payer: COMMERCIAL

## 2025-07-24 VITALS
HEART RATE: 62 BPM | OXYGEN SATURATION: 99 % | DIASTOLIC BLOOD PRESSURE: 76 MMHG | SYSTOLIC BLOOD PRESSURE: 136 MMHG | WEIGHT: 200 LBS | HEIGHT: 75 IN | BODY MASS INDEX: 24.87 KG/M2 | RESPIRATION RATE: 18 BRPM | TEMPERATURE: 97.3 F

## 2025-07-24 PROBLEM — T54.3X1A: Status: ACTIVE | Noted: 2025-07-24

## 2025-07-24 PROBLEM — K22.2 ESOPHAGEAL STRICTURE: Status: ACTIVE | Noted: 2025-07-24

## 2025-07-24 PROBLEM — T39.395A ADVERSE REACTION TO NON-STEROIDAL ANTI-INFLAMMATORY DRUG (NSAID): Status: ACTIVE | Noted: 2025-07-24

## 2025-07-24 PROCEDURE — 2709999900 HC NON-CHARGEABLE SUPPLY: Performed by: INTERNAL MEDICINE

## 2025-07-24 PROCEDURE — 2580000003 HC RX 258: Performed by: INTERNAL MEDICINE

## 2025-07-24 PROCEDURE — 3609017700 HC EGD DILATION GASTRIC/DUODENAL STRICTURE: Performed by: INTERNAL MEDICINE

## 2025-07-24 PROCEDURE — 74220 X-RAY XM ESOPHAGUS 1CNTRST: CPT

## 2025-07-24 PROCEDURE — 3700000001 HC ADD 15 MINUTES (ANESTHESIA): Performed by: INTERNAL MEDICINE

## 2025-07-24 PROCEDURE — 3700000000 HC ANESTHESIA ATTENDED CARE: Performed by: INTERNAL MEDICINE

## 2025-07-24 PROCEDURE — 3609012400 HC EGD TRANSORAL BIOPSY SINGLE/MULTIPLE: Performed by: INTERNAL MEDICINE

## 2025-07-24 PROCEDURE — 6360000002 HC RX W HCPCS: Performed by: NURSE ANESTHETIST, CERTIFIED REGISTERED

## 2025-07-24 PROCEDURE — 7100000010 HC PHASE II RECOVERY - FIRST 15 MIN: Performed by: INTERNAL MEDICINE

## 2025-07-24 PROCEDURE — 7100000011 HC PHASE II RECOVERY - ADDTL 15 MIN: Performed by: INTERNAL MEDICINE

## 2025-07-24 PROCEDURE — 43249 ESOPH EGD DILATION <30 MM: CPT | Performed by: INTERNAL MEDICINE

## 2025-07-24 PROCEDURE — C1726 CATH, BAL DIL, NON-VASCULAR: HCPCS | Performed by: INTERNAL MEDICINE

## 2025-07-24 RX ORDER — GLYCOPYRROLATE 0.2 MG/ML
INJECTION INTRAMUSCULAR; INTRAVENOUS
Status: DISCONTINUED | OUTPATIENT
Start: 2025-07-24 | End: 2025-07-24 | Stop reason: SDUPTHER

## 2025-07-24 RX ORDER — LIDOCAINE HYDROCHLORIDE 10 MG/ML
INJECTION, SOLUTION INFILTRATION; PERINEURAL
Status: DISCONTINUED | OUTPATIENT
Start: 2025-07-24 | End: 2025-07-24 | Stop reason: SDUPTHER

## 2025-07-24 RX ORDER — SODIUM CHLORIDE, SODIUM LACTATE, POTASSIUM CHLORIDE, CALCIUM CHLORIDE 600; 310; 30; 20 MG/100ML; MG/100ML; MG/100ML; MG/100ML
INJECTION, SOLUTION INTRAVENOUS CONTINUOUS
Status: DISCONTINUED | OUTPATIENT
Start: 2025-07-24 | End: 2025-07-24 | Stop reason: HOSPADM

## 2025-07-24 RX ORDER — SUCRALFATE 1 G/1
1 TABLET ORAL 4 TIMES DAILY
Qty: 120 TABLET | Refills: 3 | Status: SHIPPED | OUTPATIENT
Start: 2025-07-24

## 2025-07-24 RX ORDER — PROPOFOL 10 MG/ML
INJECTION, EMULSION INTRAVENOUS
Status: DISCONTINUED | OUTPATIENT
Start: 2025-07-24 | End: 2025-07-24 | Stop reason: SDUPTHER

## 2025-07-24 RX ADMIN — LIDOCAINE HYDROCHLORIDE 100 MG: 10 INJECTION, SOLUTION INFILTRATION; PERINEURAL at 11:12

## 2025-07-24 RX ADMIN — PROPOFOL 400 MG: 10 INJECTION, EMULSION INTRAVENOUS at 11:12

## 2025-07-24 RX ADMIN — GLYCOPYRROLATE 0.1 MG: 0.2 INJECTION, SOLUTION INTRAMUSCULAR; INTRAVENOUS at 11:12

## 2025-07-24 RX ADMIN — SODIUM CHLORIDE, SODIUM LACTATE, POTASSIUM CHLORIDE, AND CALCIUM CHLORIDE: .6; .31; .03; .02 INJECTION, SOLUTION INTRAVENOUS at 10:50

## 2025-07-24 ASSESSMENT — PAIN - FUNCTIONAL ASSESSMENT
PAIN_FUNCTIONAL_ASSESSMENT: NONE - DENIES PAIN

## 2025-07-24 ASSESSMENT — LIFESTYLE VARIABLES: SMOKING_STATUS: 0

## 2025-07-24 NOTE — H&P
Patient Name: Cory Mcbride  : 1965  MRN: [id number]  DATE: 25    Allergies: No Known Allergies     ENDOSCOPY  History and Physical    Procedure:    [] Diagnostic Colonoscopy       [] Screening Colonoscopy  [x] EGD      [] ERCP      [] EUS       [] Other    [x] Previous office notes/History and Physical reviewed from the patients chart. Please see EMR for further details of HPI. I have examined the patient's status immediately prior to the procedure and:      Indications/HPI:      Esophageal dysphagia   History of lye ingestion in childhood with esophageal stricture subsequently requiring dilation when he was 20 years old  History of NSAIDs use  EGD on 2025 with the following findings:an incomplete exam up to the middle third of esophagus only with a failed attempt at balloon dilation over the guidewire of the stricture up to 12 mm, despite which the endoscope tip could not be advanced any further.      []Abdominal Pain   []Cancer- GI/Lung     []Fhx of colon CA/polyps  []History of Polyps  []Barretts            []Melena  []Abnormal Imaging              []Dysphagia              []Persistent Pneumonia   []Anemia                            []Food Impaction        []History of Polyps  [] GI Bleed             []Pulmonary nodule/Mass   []Change in bowel habits []Heartburn/Reflux  []Rectal Bleed (BRBPR)  []Chest Pain - Non Cardiac []Heme (+) Stool []Ulcers  []Constipation  []Hemoptysis  []Varices  []Diarrhea  []Hypoxemia    []Nausea/Vomiting   []Screening   []Crohns/Colitis  []Other:     Anesthesia:   [x] MAC [] Moderate Sedation   [] General   [] None     ROS: 12 pt Review of Symptoms was negative unless mentioned above    Medications:   Prior to Admission medications    Medication Sig Start Date End Date Taking? Authorizing Provider   ibuprofen (ADVIL;MOTRIN) 800 MG tablet TAKE ONE TABLET BY MOUTH TWICE DAILY AS NEEDED FOR PAIN 25   Provider, MD Jake   metFORMIN (GLUCOPHAGE) 500 MG  tablet Take 1 tablet by mouth daily  Patient not taking: Reported on 7/1/2025 5/13/25   ProviderJake MD   DULoxetine (CYMBALTA) 20 MG extended release capsule Take 1 capsule by mouth daily 5/13/25   Provider, MD Jake       Past Medical History:  Past Medical History:   Diagnosis Date    Inguinal hernia     Lye ingestion     as a child; \"affected my throat\"       Past Surgical History:  Past Surgical History:   Procedure Laterality Date    ANKLE FRACTURE SURGERY      \"screw\"    HERNIA REPAIR Right 12/04/2024    ROBOTIC  LAPAROSCOPIC RIGHT INGUINAL HERNIA POSSIBLE BILATERAL performed by Maeve Vincent DO at [facility] OR    UPPER GASTROINTESTINAL ENDOSCOPY      drank 8 lye as child    UPPER GASTROINTESTINAL ENDOSCOPY N/A 07/01/2025    Dr Bhandari, W CRE balloon guided dil, tight ringlike stricture was noted in esoph, the endoscopy tip could not be negotiated through the structured segment, incomplete exam, repeat this week    UPPER GASTROINTESTINAL ENDOSCOPY  07/01/2025    Dr Bhandari, CRE balloon guided dil over guidewire       Social History:  Social History     Tobacco Use    Smoking status: Former     Current packs/day: 0.50     Types: Cigarettes    Smokeless tobacco: Never   Vaping Use    Vaping status: Some Days   Substance Use Topics    Alcohol use: Not Currently     Comment: occ    Drug use: Never       Vital Signs:   There were no vitals filed for this visit.     Physical Exam:  Cardiac:  [x]WNL  []Comments:  Pulmonary:  [x]WNL   []Comments:  Neuro/Mental Status:  [x]WNL  []Comments:  Abdominal:  [x]WNL    []Comments:  Other:   []WNL  []Comments:    Informed Consent:  The risks and benefits of the procedure have been discussed with either the patient or if they cannot consent, their representative.    Assessment:  Patient examined and appropriate for planned sedation and procedure.     Plan:  Proceed with planned sedation and procedure as above.         Izabel Bhandari,

## 2025-07-24 NOTE — ANESTHESIA POSTPROCEDURE EVALUATION
Department of Anesthesiology  Postprocedure Note    Patient: Cory Mcbride  MRN: [id number]  YOB: 1965  Date of evaluation: 7/24/2025    Procedure Summary       Date: 07/24/25 Room / Location: [place] [address] / [facility]    Anesthesia Start: 1108 Anesthesia Stop: 1137    Procedures:       ESOPHAGOGASTRODUODENOSCOPY BIOPSY (Esophagus)      ESOPHAGOGASTRODUODENOSCOPY DILATION BALLOON Diagnosis:       Esophageal dysphagia      (Esophageal dysphagia [R13.19])    Surgeons: Izabel Bhandari MD Responsible Provider: Marti Aranda APRN - CRNA    Anesthesia Type: General, TIVA ASA Status: 2            Anesthesia Type: General, TIVA    Estee Phase I: Estee Score: 10    Estee Phase II:      Anesthesia Post Evaluation    Patient location during evaluation: PACU  Patient participation: complete - patient participated  Level of consciousness: sleepy but conscious  Pain score: 0  Airway patency: patent  Nausea & Vomiting: no nausea and no vomiting  Cardiovascular status: blood pressure returned to baseline  Respiratory status: acceptable, room air, spontaneous ventilation and nonlabored ventilation  Hydration status: stable  Comments: BP (!) 96/58   Pulse 66   Temp 97 °F (36.1 °C)   Resp 14   Ht 1.905 m (6' 3\")   Wt 90.7 kg (200 lb)   SpO2 94%   BMI 25.00 kg/m²     Pain management: adequate    No notable events documented.

## 2025-07-24 NOTE — ANESTHESIA PRE PROCEDURE
Department of Anesthesiology  Preprocedure Note       Name:  Cory Mcbride   Age:  60 y.o.  :  1965                                          MRN:  [id number]         Date:  2025      Surgeon: Surgeon(s):  Izabel Bhandari MD    Procedure: Procedure(s):  ESOPHAGOGASTRODUODENOSCOPY BIOPSY    Medications prior to admission:   Prior to Admission medications    Medication Sig Start Date End Date Taking? Authorizing Provider   ibuprofen (ADVIL;MOTRIN) 800 MG tablet TAKE ONE TABLET BY MOUTH TWICE DAILY AS NEEDED FOR PAIN 25  Yes Jake Malone MD   metFORMIN (GLUCOPHAGE) 500 MG tablet Take 1 tablet by mouth daily  Patient not taking: Reported on 2025   Jake Malone MD   DULoxetine (CYMBALTA) 20 MG extended release capsule Take 1 capsule by mouth daily 25  Yes Jake Malone MD       Current medications:    Current Facility-Administered Medications   Medication Dose Route Frequency Provider Last Rate Last Admin    lactated ringers infusion   IntraVENous Continuous Izabel Bhandari MD 40 mL/hr at 25 1050 New Bag at 25 1050       Allergies:  No Known Allergies    Problem List:    Patient Active Problem List   Diagnosis Code    Non-recurrent inguinal hernia of right side without obstruction or gangrene K40.90    Right inguinal hernia K40.90    Esophageal dysphagia R13.19    Esophageal stricture K22.2    Adverse reaction to non-steroidal anti-inflammatory drug (NSAID) T39.395A    Toxic effect of lye T54.3X1A       Past Medical History:        Diagnosis Date    Inguinal hernia     Lye ingestion     as a child; \"affected my throat\"       Past Surgical History:        Procedure Laterality Date    ANKLE FRACTURE SURGERY      \"screw\"    HERNIA REPAIR Right 2024    ROBOTIC  LAPAROSCOPIC RIGHT INGUINAL HERNIA POSSIBLE BILATERAL performed by Maeve Vincent DO at [facility] OR    UPPER GASTROINTESTINAL ENDOSCOPY      drank 8 lye as child    UPPER

## 2025-07-24 NOTE — OP NOTE
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     Anesthesia:  Sedation was administered by anesthesia who monitored the patient during the procedure.    Estimated Blood Loss: minimal    Procedure:   After reviewing the patient's chart and obtaining informed consent, the patient was placed in the left lateral decubitus position.  A forward-viewing Olympus endoscope was lubricated and inserted through the mouth into the oropharynx. Under direct visualization, the upper esophagus was intubated.  The scope was advanced to the level of the middle third of the esophagus only to the proximal aspect of a tight ringlike stricture.  (Further advancement of the regular forward-viewing endoscope tip was not feasible on this exam before or after attempts at a wire-guided balloon dilation of the stricture).      Next, using a 5.6 mm in diameter thin spaghetti scope, the tip of the scope could be advanced only to the proximal aspect of the focal stricture in the mid esophagus despite previous balloon dilation of the stricture up to 12 mm on 2025 suggesting the stricture had a recurrence.       With the thin

## 2025-07-24 NOTE — PROGRESS NOTES
Spoke with radiology regarding getting swallowing study done today. Will be able to do swallowing study around 1400. Pt and pt's  informed and all agreeable to have swallowing study done today. Pt's  states just to call when pt is ready to be discharged.

## 2025-07-24 NOTE — DISCHARGE INSTRUCTIONS
1.  Patient to get his barium swallow/UGI esophagogram done today prior to discharge from the facility.   2.  Repeat EGD at [facility] on a Thursday 2 weeks from today for evaluation (and hopefully after his upper GI barium esophagogram results are available) when examination with the 5.6 mm in diameter spaghetti scope and/or with fluoroscopy if necessary can be done.  If the stricture has not had a recurrence back to its original tightness, will consider further dilation of the esophageal stricture with Savary or TTS-CRE balloon at that time up to 12 mm if feasible.      Depending on the results of esophagogram today and his next EGD exam in 2 weeks, we will also consider referral of this patient to a tertiary center such as [facility] where interventional GI with thoracic surgery backup will be available for appropriate management of his esophageal stricture.     3.  Strict soft/puréed diet until his next EGD can be done     4.  Avoid NSAIDs strictly; may use Tylenol 500 mg p.o. every 4 hours as needed instead     5.  Continue empirical therapy for GERD along with anti-GERD measures.    6.  Carafate suspension 1 g p.o. before every meal and at bedtime x 4 weeks; 2 refills     - Resume previous meds and diet  - GI clinic f/u 6-8 weeks with Ms. Harrell    - Keep scheduled f/u appts with other MDs     Upper GI Endoscopy: What to Expect at Home  Your Recovery  You had an upper GI endoscopy. Your doctor used a thin, lighted tube that bends to look at the inside of your esophagus, your stomach, and the first part of the small intestine, called the duodenum.    How can you care for yourself at home?  Activity   Rest as much as you need to after you go home.  You should be able to go back to your usual activities the day after the test.  Due to anesthesia, no driving or operating equipment for 24 hours.  Diet   Follow your doctor's directions for eating after the test.  Drink plenty of

## 2025-08-01 ENCOUNTER — TELEPHONE (OUTPATIENT)
Dept: GASTROENTEROLOGY | Age: 60
End: 2025-08-01

## 2025-08-01 DIAGNOSIS — K22.2 ESOPHAGEAL STRICTURE: ICD-10-CM

## 2025-08-01 DIAGNOSIS — R13.19 ESOPHAGEAL DYSPHAGIA: ICD-10-CM

## 2025-08-01 DIAGNOSIS — R93.3 ABNORMAL ENDOSCOPY OF UPPER GASTROINTESTINAL TRACT: Primary | ICD-10-CM

## 2025-08-01 NOTE — TELEPHONE ENCOUNTER
Kenna/LT CASTILLO    PT is asking for go ahead and have to the tertiary referral instead of proceeding with egd. He does have passport so maybe not Lucas. They are wanting to know at the facility if this is possible. Please advise. I explained that they may need to have the PCP do the referral with passport, not sure how that works.    y for evaluation (and hopefully after his upper GI barium esophagogram results are available) when examination with the 5.6 mm in diameter spaghetti scope and/or with fluoroscopy if necessary can be done.  If the stricture has not had a recurrence back to its original tightness, will consider further dilation of the esophageal stricture with Savary or TTS-CRE balloon at that time up to 12 mm if feasible.       Depending on the results of esophagogram today and his next EGD exam in 2 weeks, we will also consider referral of this patient to a tertiary center such as [facility] where interventional GI with thoracic surgery backup will be available for appropriate management of his esophageal stricture.     3.  Strict soft/puréed diet until his next EGD can be done     4.  Avoid NSAIDs strictly; may use Tylenol 500 mg p.o. every 4 hours as needed instead     5.  Continue empirical therapy for GERD along with anti-GERD measures.     6.  Carafate suspension 1 g p.o. before every meal and at bedtime x 4 weeks; 2 refills     - Resume previous meds and diet  - GI clinic f/u 6-8 weeks with Ms. Harrell    - Keep scheduled f/u appts with other MDs      The results were discussed with the patient and family.  A copy of the images obtained were given to the patient.      (Please note that portions of this note were completed with a voice recognition program. Efforts were made to edit the dictations but occasionally words may be mis-transcribed.)      Izabel Bhandari MD, MD  7/24/2025  10:46 AM            Electronically signed by Izabel Bhandari MD at

## 2025-08-05 ENCOUNTER — TELEPHONE (OUTPATIENT)
Dept: GASTROENTEROLOGY | Age: 60
End: 2025-08-05

## 2025-08-12 ENCOUNTER — OFFICE VISIT (OUTPATIENT)
Dept: GASTROENTEROLOGY | Age: 60
End: 2025-08-12
Payer: COMMERCIAL

## 2025-08-12 VITALS
HEIGHT: 75 IN | WEIGHT: 199 LBS | SYSTOLIC BLOOD PRESSURE: 120 MMHG | HEART RATE: 77 BPM | OXYGEN SATURATION: 96 % | DIASTOLIC BLOOD PRESSURE: 70 MMHG | BODY MASS INDEX: 24.74 KG/M2

## 2025-08-12 DIAGNOSIS — Z87.19 S/P DILATATION OF ESOPHAGEAL STRICTURE: ICD-10-CM

## 2025-08-12 DIAGNOSIS — R13.19 ESOPHAGEAL DYSPHAGIA: ICD-10-CM

## 2025-08-12 DIAGNOSIS — Z98.890 S/P DILATATION OF ESOPHAGEAL STRICTURE: ICD-10-CM

## 2025-08-12 DIAGNOSIS — K22.2 ESOPHAGEAL STRICTURE: Primary | ICD-10-CM

## 2025-08-12 PROCEDURE — 99214 OFFICE O/P EST MOD 30 MIN: CPT

## 2025-08-12 RX ORDER — SUCRALFATE 1 G/1
1 TABLET ORAL 4 TIMES DAILY
Qty: 120 TABLET | Refills: 3 | Status: SHIPPED | OUTPATIENT
Start: 2025-08-12

## 2025-08-12 ASSESSMENT — ENCOUNTER SYMPTOMS
VOMITING: 0
TROUBLE SWALLOWING: 1
NAUSEA: 0
GASTROINTESTINAL NEGATIVE: 1
CHOKING: 1
ALLERGIC/IMMUNOLOGIC NEGATIVE: 1
EYES NEGATIVE: 1
ABDOMINAL PAIN: 0

## (undated) DEVICE — CLEANING SPONGE: Brand: KOALA™

## (undated) DEVICE — COVER LT HNDL BLU PLAS

## (undated) DEVICE — SUPPLEMENT DIGESTIVE H2O SOL GI-EASE

## (undated) DEVICE — LIQUIBAND RAPID ADHESIVE 36/CS 0.8ML: Brand: MEDLINE

## (undated) DEVICE — SOLUTION ANTIFOG VIS SYS CLEARIFY LAPSCP

## (undated) DEVICE — CATHETER IV 14 GAX2 IN WNG INTROCAN SAFETY

## (undated) DEVICE — GOWN, ORBIS, XLONG/XLARGE, STERILE: Brand: MEDLINE

## (undated) DEVICE — SEAL

## (undated) DEVICE — SUTURE MONOCRYL SZ 4-0 L18IN ABSRB UD L19MM PS-2 3/8 CIR PRIM Y496G

## (undated) DEVICE — SUTURE VICRYL SZ 3-0 L27IN ABSRB VLT L26MM SH 1/2 CIR J316H

## (undated) DEVICE — BLADELESS OBTURATOR: Brand: WECK VISTA

## (undated) DEVICE — BITE BLOCK ENDOSCP AD 60 FR W/ ADJ STRP PLAS GRN BLOX

## (undated) DEVICE — CANNULA NSL AD L7FT DIV O2 CO2 W/ M LUERLOCK TRMPT CONN

## (undated) DEVICE — TOTAL TRAY, 16FR 10ML SIL FOLEY, URN: Brand: MEDLINE

## (undated) DEVICE — TIP COVER ACCESSORY

## (undated) DEVICE — GLOVE SURG SZ 7 CRM LTX FREE POLYISOPRENE POLYMER BEAD ANTI

## (undated) DEVICE — Device

## (undated) DEVICE — ESOPHAGEAL/PYLORIC/COLONIC/BILIARY WIREGUIDED BALLOON DILATATION CATHETER: Brand: CRE™ PRO

## (undated) DEVICE — ENDO KIT,LOURDES HOSPITAL: Brand: MEDLINE INDUSTRIES, INC.

## (undated) DEVICE — ARM DRAPE

## (undated) DEVICE — COLUMN DRAPE

## (undated) DEVICE — ADAPTER CLEANING PORPOISE CLEANING

## (undated) DEVICE — 3M™ IOBAN™ 2 ANTIMICROBIAL INCISE DRAPE 6650EZ: Brand: IOBAN™ 2

## (undated) DEVICE — SYRINGE INFL 60ML DISP ALLIANCE II

## (undated) DEVICE — 40595 XL TRENDELENBURG POSITIONING KIT: Brand: 40595 XL TRENDELENBURG POSITIONING KIT

## (undated) DEVICE — COVER,MAYO STAND,STERILE: Brand: MEDLINE

## (undated) DEVICE — BRUSH ENDOSCP 2 END CHN HEDGEHOG

## (undated) DEVICE — SINGLE PORT MANIFOLD: Brand: NEPTUNE 2

## (undated) DEVICE — CLEANING BRUSH - SINGLE USE: Brand: SAFEGUIDE®

## (undated) DEVICE — STRATAFIX SPRL PDS + 2-0 23CM CT-2

## (undated) DEVICE — SUTURE VICRYL CTD ANTBCTRL 54 IN TI PLU VLT

## (undated) DEVICE — FORCEPS BX 240CM 2.4MM L NDL RAD JAW 4 M00513334